# Patient Record
Sex: FEMALE | Race: WHITE | URBAN - NONMETROPOLITAN AREA
[De-identification: names, ages, dates, MRNs, and addresses within clinical notes are randomized per-mention and may not be internally consistent; named-entity substitution may affect disease eponyms.]

---

## 2018-04-25 DIAGNOSIS — M79.671 RIGHT FOOT PAIN: Primary | ICD-10-CM

## 2018-04-25 RX ORDER — HYDROCODONE BITARTRATE AND ACETAMINOPHEN 10; 325 MG/1; MG/1
1 TABLET ORAL EVERY 6 HOURS PRN
Qty: 15 TABLET | Refills: 0 | Status: SHIPPED | OUTPATIENT
Start: 2018-04-25 | End: 2018-05-17

## 2018-04-26 ENCOUNTER — TELEPHONE (OUTPATIENT)
Dept: FAMILY MEDICINE CLINIC | Facility: CLINIC | Age: 33
End: 2018-04-26

## 2018-05-17 ENCOUNTER — PATIENT MESSAGE (OUTPATIENT)
Dept: FAMILY MEDICINE CLINIC | Facility: CLINIC | Age: 33
End: 2018-05-17

## 2018-05-17 NOTE — TELEPHONE ENCOUNTER
From: Mick Starks  To: Jerman Lowe DO  Sent: 5/17/2018 3:02 PM CDT  Subject: Other    So I just finished my pain relievers (hydrocodone). I do ok with managing during the day but I'm still in alot pf pain at the end of the night.  Can you do a refil

## 2018-05-18 RX ORDER — HYDROCODONE BITARTRATE AND ACETAMINOPHEN 10; 325 MG/1; MG/1
1 TABLET ORAL EVERY 6 HOURS PRN
Qty: 15 TABLET | Refills: 0 | Status: SHIPPED | OUTPATIENT
Start: 2018-05-18 | End: 2018-08-01 | Stop reason: ALTCHOICE

## 2018-07-27 RX ORDER — ACYCLOVIR 50 MG/G
OINTMENT TOPICAL
Qty: 15 G | Refills: 0 | Status: SHIPPED | OUTPATIENT
Start: 2018-07-27 | End: 2018-08-01 | Stop reason: ALTCHOICE

## 2018-08-01 ENCOUNTER — TELEPHONE (OUTPATIENT)
Dept: FAMILY MEDICINE CLINIC | Facility: CLINIC | Age: 33
End: 2018-08-01

## 2018-08-01 ENCOUNTER — OFFICE VISIT (OUTPATIENT)
Dept: FAMILY MEDICINE CLINIC | Facility: CLINIC | Age: 33
End: 2018-08-01
Payer: OTHER GOVERNMENT

## 2018-08-01 VITALS
SYSTOLIC BLOOD PRESSURE: 118 MMHG | BODY MASS INDEX: 36.19 KG/M2 | DIASTOLIC BLOOD PRESSURE: 80 MMHG | HEIGHT: 63 IN | WEIGHT: 204.25 LBS | TEMPERATURE: 98 F

## 2018-08-01 DIAGNOSIS — L05.91 INFECTED PILONIDAL CYST: Primary | ICD-10-CM

## 2018-08-01 PROCEDURE — 96372 THER/PROPH/DIAG INJ SC/IM: CPT | Performed by: FAMILY MEDICINE

## 2018-08-01 PROCEDURE — 99214 OFFICE O/P EST MOD 30 MIN: CPT | Performed by: FAMILY MEDICINE

## 2018-08-01 RX ORDER — AMOXICILLIN AND CLAVULANATE POTASSIUM 875; 125 MG/1; MG/1
1 TABLET, FILM COATED ORAL 2 TIMES DAILY
Qty: 20 TABLET | Refills: 0 | Status: SHIPPED | OUTPATIENT
Start: 2018-08-01 | End: 2018-08-11

## 2018-08-01 RX ORDER — CEFTRIAXONE 1 G/1
1000 INJECTION, POWDER, FOR SOLUTION INTRAMUSCULAR; INTRAVENOUS ONCE
Status: COMPLETED | OUTPATIENT
Start: 2018-08-01 | End: 2018-08-01

## 2018-08-01 RX ADMIN — CEFTRIAXONE 1000 MG: 1 INJECTION, POWDER, FOR SOLUTION INTRAMUSCULAR; INTRAVENOUS at 14:30:00

## 2018-08-01 NOTE — PROGRESS NOTES
Mick Starks is a 28year old female. HPI:   Kelly Cerna had a pilonidal cyst resected 5 years ago. Past week has had tenderness and feels scar is splitting open and oozing. No fever. Is worried has recurring cyst. Tender to sit down.      Current Outpatie is asked to return in 2 days.

## 2018-08-01 NOTE — TELEPHONE ENCOUNTER
Problems about a week. Not for certain this was the site of surgery. Red on both sides of \"butt cheeks/crack\". Bleeding, but not excessively from incision site. Uncomfortable. Appointment scheduled.

## 2018-08-01 NOTE — PATIENT INSTRUCTIONS
Pilonidal Cyst, Infected (Antibiotic Treatment)  A pilonidal cyst is a swelling that starts under the skin on the sacrum near the tailbone. It may look like a small dimple. It can fill with skin oils, hair, and dead skin cells.  It may stay small or grow · Take acetaminophen or ibuprofen for pain, unless you were given a different pain medicine to use. Talk with your doctor before using these medicines if you have chronic liver or kidney disease, or have ever had a stomach ulcer or digestive bleeding.  Also

## 2018-08-01 NOTE — TELEPHONE ENCOUNTER
SHE HAD SURGERY QUITE A FEW YEARS AGO AND NOW THAT SITE IS OPENING UP AND IT IS ON HER TAILBONE.  SORE

## 2018-08-02 NOTE — PROGRESS NOTES
Baljinder Tuttle is a 28year old female. HPI:   Rafael Haro is here for follow up on her cellulitis at surgical site ( old) of her pilonidal cyst. Did well with rocephin and is taking augmentin. Doing sitz baths and is feeling better. No fever.     Current Ou

## 2018-08-02 NOTE — PATIENT INSTRUCTIONS
Wound Check, Infection  You have a wound that has become infected. The wound will not heal properly unless the infection is cleared. Infection in a wound may also spread if it is not treated.  In most cases, antibiotic medicines are prescribed to treat a ? Throw out the old dressing. ? Wash your hands again before opening the new, clean dressing. ? Wash your hands again when you are done.   Follow-up care  Follow up with your healthcare provider as advised. If a culture was done, you will be notified if y

## 2018-08-03 ENCOUNTER — OFFICE VISIT (OUTPATIENT)
Dept: FAMILY MEDICINE CLINIC | Facility: CLINIC | Age: 33
End: 2018-08-03
Payer: OTHER GOVERNMENT

## 2018-08-03 VITALS
WEIGHT: 203 LBS | BODY MASS INDEX: 36 KG/M2 | TEMPERATURE: 98 F | OXYGEN SATURATION: 99 % | DIASTOLIC BLOOD PRESSURE: 68 MMHG | HEART RATE: 74 BPM | SYSTOLIC BLOOD PRESSURE: 120 MMHG

## 2018-08-03 DIAGNOSIS — Z09 FOLLOW UP: Primary | ICD-10-CM

## 2018-08-03 DIAGNOSIS — L05.91 INFECTED PILONIDAL CYST: ICD-10-CM

## 2018-08-03 PROCEDURE — 99213 OFFICE O/P EST LOW 20 MIN: CPT | Performed by: FAMILY MEDICINE

## 2018-08-29 ENCOUNTER — PATIENT MESSAGE (OUTPATIENT)
Dept: FAMILY MEDICINE CLINIC | Facility: CLINIC | Age: 33
End: 2018-08-29

## 2018-08-29 ENCOUNTER — TELEPHONE (OUTPATIENT)
Dept: FAMILY MEDICINE CLINIC | Facility: CLINIC | Age: 33
End: 2018-08-29

## 2018-08-29 NOTE — TELEPHONE ENCOUNTER
Pt. Twin Wasserman she has infection under finger nail. She wanted to know if Dr. Krissy Sandra would call in any antibiotics.

## 2018-08-29 NOTE — TELEPHONE ENCOUNTER
From: Rissa Arredondo  To: Veronda Lefort, DO  Sent: 8/29/2018 12:21 PM CDT  Subject: Non-Urgent Medical Question    I have a infection on my index finger under the cuticle. Not sure what I did but it is very painful and started to ooze push today.  There i

## 2018-08-29 NOTE — TELEPHONE ENCOUNTER
Pt states she was doing some gardening the other day. She was wearing gloves. Yesterday she woke up and her L index finger on the outer part was painful. Area became warm and swollen and had a bump on it. She pushed on it this morning and pus came out.  Are

## 2018-08-30 ENCOUNTER — OFFICE VISIT (OUTPATIENT)
Dept: FAMILY MEDICINE CLINIC | Facility: CLINIC | Age: 33
End: 2018-08-30
Payer: OTHER GOVERNMENT

## 2018-08-30 VITALS
DIASTOLIC BLOOD PRESSURE: 78 MMHG | WEIGHT: 199.13 LBS | TEMPERATURE: 97 F | SYSTOLIC BLOOD PRESSURE: 128 MMHG | BODY MASS INDEX: 35 KG/M2

## 2018-08-30 DIAGNOSIS — L03.012 PARONYCHIA OF FINGER, LEFT: Primary | ICD-10-CM

## 2018-08-30 PROCEDURE — 99213 OFFICE O/P EST LOW 20 MIN: CPT | Performed by: INTERNAL MEDICINE

## 2018-08-30 RX ORDER — AMOXICILLIN 500 MG/1
500 CAPSULE ORAL 3 TIMES DAILY
Qty: 21 CAPSULE | Refills: 0 | Status: SHIPPED | OUTPATIENT
Start: 2018-08-30 | End: 2018-09-06

## 2018-08-30 NOTE — PROGRESS NOTES
Alexandra Ellington is a 28year old female. HPI:   Pt has a left index finger with tenderness and expression of pus. She works as a hairdresser.      Current Outpatient Prescriptions:  amoxicillin 500 MG Oral Cap Take 1 capsule (500 mg total) by mouth 3 (thre

## 2018-11-10 ENCOUNTER — TELEPHONE (OUTPATIENT)
Dept: FAMILY MEDICINE CLINIC | Facility: CLINIC | Age: 33
End: 2018-11-10

## 2018-11-10 RX ORDER — ACYCLOVIR 50 MG/G
OINTMENT TOPICAL
Qty: 15 G | Refills: 0 | Status: SHIPPED | OUTPATIENT
Start: 2018-11-10

## 2019-03-07 ENCOUNTER — TELEPHONE (OUTPATIENT)
Dept: FAMILY MEDICINE CLINIC | Facility: CLINIC | Age: 34
End: 2019-03-07

## 2019-03-07 NOTE — TELEPHONE ENCOUNTER
UTI, BLEEDING AND 20 WEEKS PREGNANT. BEEN DRIVING FOR OVER 20 HOURS. IS STILL 2 HOURS AWAY FROM [place] ASKING TO STOP IN TOWN TO  SCRIPT.   OBGYN IS NOT IN OFFICE TODAY   PLEASE CALL ASAP

## 2019-03-07 NOTE — TELEPHONE ENCOUNTER
LM for patient that we are closed, she should go to the nearest ER or UC. Call us with an update tomorrow.  JARVIS Mederos RN

## 2019-08-02 ENCOUNTER — OFFICE VISIT (OUTPATIENT)
Dept: FAMILY MEDICINE CLINIC | Facility: CLINIC | Age: 34
End: 2019-08-02
Payer: OTHER GOVERNMENT

## 2019-08-02 VITALS
HEART RATE: 84 BPM | HEIGHT: 63.75 IN | RESPIRATION RATE: 20 BRPM | DIASTOLIC BLOOD PRESSURE: 80 MMHG | SYSTOLIC BLOOD PRESSURE: 114 MMHG | TEMPERATURE: 98 F | WEIGHT: 229 LBS | BODY MASS INDEX: 39.58 KG/M2

## 2019-08-02 DIAGNOSIS — H61.21 HEARING LOSS OF RIGHT EAR DUE TO CERUMEN IMPACTION: Primary | ICD-10-CM

## 2019-08-02 PROCEDURE — 99213 OFFICE O/P EST LOW 20 MIN: CPT | Performed by: FAMILY MEDICINE

## 2019-08-02 NOTE — PATIENT INSTRUCTIONS
Earwax Removal    The ear canal makes earwax from the canal’s lining. The ears make wax to lubricate and protect the ear canal. The ear canal is the tube that connects the middle ear to the outside of the ear.  The wax protects the ear from bacteria, infe · Don’t use cotton swabs in your ears. Cotton swabs may push wax deeper into the ear canal or damage the eardrum.  Use cotton gauze or a wet washcloth  to gently remove wax on the outside of the ear and around the opening to the ear canal.  · Don't use any © 4837-6526 [address] [facility] [place]. All rights reserved. This information is not intended as a substitute for professional medical care. Always follow your healthcare professional's instructions.

## 2019-08-02 NOTE — PROGRESS NOTES
HPI:   Jorge Marlow is a 27year old female who presents for upper respiratory symptoms for  Plugged ear on right. Sister just diagnosed with sudden hearing loss. Etiology not known. Right ear plugged .  Used candle ear wick with some improvement and now Prescriptions      No prescriptions requested or ordered in this encounter       Imaging & Consults:  None    Cerumen removed with irrigation  Hearing normal after removal.   If hearing changes to return and will get hearing test.      The patient indicate

## 2019-08-15 ENCOUNTER — TELEPHONE (OUTPATIENT)
Dept: FAMILY MEDICINE CLINIC | Facility: CLINIC | Age: 34
End: 2019-08-15

## 2019-08-15 RX ORDER — ONDANSETRON 4 MG/1
4 TABLET, FILM COATED ORAL EVERY 8 HOURS PRN
Qty: 20 TABLET | Refills: 2 | Status: SHIPPED | OUTPATIENT
Start: 2019-08-15

## 2019-08-15 NOTE — TELEPHONE ENCOUNTER
EXCESSIVE DIARRHEA FOR 5 DAYS, CAN SHE BE SEEN TOMORROW @ 4:15 ALONG WITH HER SON WHO ALREADY HAS APPT?  PT ALSO SENT [employer] MESSAGE REAGRDING THIS

## 2019-08-15 NOTE — TELEPHONE ENCOUNTER
The pt states she has had liquid diarrhea for 5 days. She has had nausea and vomiting. Denies fevers.  She is able to get in about 32 oz of fluids a day and is eating small amounts as she is breast feeding her  and will only eat because she knows she

## 2019-08-15 NOTE — TELEPHONE ENCOUNTER
The pt is aware and v/u. She states no one in her house is sick and there is 8 of them. She wants to make sure that the Zofran is ok to take while breast feeding?

## 2019-08-16 DIAGNOSIS — R19.7 DIARRHEA OF PRESUMED INFECTIOUS ORIGIN: Primary | ICD-10-CM

## 2020-04-30 ENCOUNTER — MED REC SCAN ONLY (OUTPATIENT)
Dept: FAMILY MEDICINE CLINIC | Facility: CLINIC | Age: 35
End: 2020-04-30

## 2020-10-21 ENCOUNTER — OFFICE VISIT (OUTPATIENT)
Dept: FAMILY MEDICINE CLINIC | Facility: CLINIC | Age: 35
End: 2020-10-21
Payer: MEDICAID

## 2020-10-21 VITALS
SYSTOLIC BLOOD PRESSURE: 122 MMHG | HEART RATE: 88 BPM | WEIGHT: 214 LBS | DIASTOLIC BLOOD PRESSURE: 78 MMHG | HEIGHT: 63.75 IN | BODY MASS INDEX: 36.99 KG/M2 | RESPIRATION RATE: 18 BRPM | TEMPERATURE: 97 F | OXYGEN SATURATION: 99 %

## 2020-10-21 DIAGNOSIS — D18.00 HEMANGIOMA, MULTIPLE: Primary | ICD-10-CM

## 2020-10-21 DIAGNOSIS — Z23 NEED FOR VACCINATION: ICD-10-CM

## 2020-10-21 PROCEDURE — 90471 IMMUNIZATION ADMIN: CPT | Performed by: FAMILY MEDICINE

## 2020-10-21 PROCEDURE — 3008F BODY MASS INDEX DOCD: CPT | Performed by: FAMILY MEDICINE

## 2020-10-21 PROCEDURE — 90686 IIV4 VACC NO PRSV 0.5 ML IM: CPT | Performed by: FAMILY MEDICINE

## 2020-10-21 PROCEDURE — 99214 OFFICE O/P EST MOD 30 MIN: CPT | Performed by: FAMILY MEDICINE

## 2020-10-21 PROCEDURE — 3074F SYST BP LT 130 MM HG: CPT | Performed by: FAMILY MEDICINE

## 2020-10-21 PROCEDURE — 3078F DIAST BP <80 MM HG: CPT | Performed by: FAMILY MEDICINE

## 2020-10-21 RX ORDER — ACYCLOVIR 50 MG/G
OINTMENT TOPICAL
Qty: 15 G | Refills: 0 | Status: CANCELLED | OUTPATIENT
Start: 2020-10-21

## 2020-10-21 NOTE — PROGRESS NOTES
Mariel Espinosa is a 38year old female. HPI:   Has new skin lesions around her nipples. Right breast with more than left. Noticed many small red dots over her   Multiple pregnancies. No longer nursing.    Current Outpatient Medications   Medication Sig D

## 2021-01-07 ENCOUNTER — HOSPITAL ENCOUNTER (OUTPATIENT)
Age: 36
Discharge: OTHER INSTITUTION | End: 2021-01-07

## 2021-01-07 ENCOUNTER — TELEPHONE (OUTPATIENT)
Dept: FAMILY MEDICINE CLINIC | Facility: CLINIC | Age: 36
End: 2021-01-07

## 2021-01-07 NOTE — TELEPHONE ENCOUNTER
Virtual/Telephone Check-In    Maggie Velasquez verbally {consents to a Virtual/Telephone Check-In service on 01/07/21. Patient has been referred to the [facility] website at [email or web address] to review the yearly Consent to Treat document.   Patient unders

## 2021-01-22 ENCOUNTER — TELEPHONE (OUTPATIENT)
Dept: FAMILY MEDICINE CLINIC | Facility: CLINIC | Age: 36
End: 2021-01-22

## 2021-01-22 NOTE — TELEPHONE ENCOUNTER
Spoke with patient who states she tested positive for covid on 1/7/21 she has completed quarantine and has no symptoms, she is wondering if it is ok for her to go out.  Advised she has completed quarantine and is ok to go out; just limit contact with lizeth

## 2021-03-26 NOTE — TELEPHONE ENCOUNTER
Beni Terrell is calling she went to use her nebulizer and it's broken and it's not blowing out any air. Can we order a new one to [facility] in [facility], [facility]..

## 2021-03-26 NOTE — TELEPHONE ENCOUNTER
Talked with Luigi Hardwick and she states she would like a Rx sent to [place] for a new nebulizer. Her's is not working and sounds like something is broken. She has been having some congestion and mild shortness of breath; thinks is allergy related.    I don't

## 2021-07-17 ENCOUNTER — PATIENT MESSAGE (OUTPATIENT)
Dept: FAMILY MEDICINE CLINIC | Facility: CLINIC | Age: 36
End: 2021-07-17

## 2021-07-19 NOTE — TELEPHONE ENCOUNTER
From: Emilie Pink  To: Elizabeth Chowdhury DO  Sent: 7/17/2021 3:30 PM CDT  Subject: Non-Urgent Medical Question    So my BRO just recently got diagnosed with stage four prostrate cancer. He has developed a rash in his groin area.  He goes to [employer]

## 2021-09-29 ENCOUNTER — TELEPHONE (OUTPATIENT)
Dept: FAMILY MEDICINE CLINIC | Facility: CLINIC | Age: 36
End: 2021-09-29

## 2021-09-29 NOTE — TELEPHONE ENCOUNTER
SHE IS ASKING FOR YOU TO HAVE DR PALMA LOOK AT THE MY CHART MESSAGE SHE SENT TO HER BECAUSE HER HEART RATE IS LOWER THAN USUAL AND SHE NEEDS TO KNOW IF SHE NEEDS TOO BE SEEN

## 2021-09-29 NOTE — TELEPHONE ENCOUNTER
Advised patient that Dr. Jean Monahan would like her to make an appt to be evaluated. Appt scheduled. Patient denies any chest pain, shortness of breath.  Patient states she has had low heart rate in the past.   Future Appointments   Date Time Provider Department

## 2021-09-30 ENCOUNTER — PATIENT MESSAGE (OUTPATIENT)
Dept: FAMILY MEDICINE CLINIC | Facility: CLINIC | Age: 36
End: 2021-09-30

## 2021-09-30 NOTE — TELEPHONE ENCOUNTER
From: Dain Barry  To: Nacho Dai DO  Sent: 9/30/2021 1:24 PM CDT  Subject: Rash on thigh    I have an appointment tomorrow but Brandon Buenrostro noticed this rash on my right thigh and thought possible shingles?  Could that be the cause for the headache and l

## 2021-10-01 ENCOUNTER — HOSPITAL ENCOUNTER (OUTPATIENT)
Dept: GENERAL RADIOLOGY | Age: 36
Discharge: HOME | End: 2021-10-01
Attending: FAMILY MEDICINE
Payer: MEDICAID

## 2021-10-01 ENCOUNTER — OFFICE VISIT (OUTPATIENT)
Dept: FAMILY MEDICINE CLINIC | Facility: CLINIC | Age: 36
End: 2021-10-01
Payer: MEDICAID

## 2021-10-01 VITALS
RESPIRATION RATE: 12 BRPM | HEART RATE: 65 BPM | DIASTOLIC BLOOD PRESSURE: 74 MMHG | BODY MASS INDEX: 39 KG/M2 | TEMPERATURE: 97 F | SYSTOLIC BLOOD PRESSURE: 120 MMHG | WEIGHT: 226 LBS

## 2021-10-01 DIAGNOSIS — U09.9 COVID-19 LONG HAULER MANIFESTING CHRONIC PALPITATIONS: ICD-10-CM

## 2021-10-01 DIAGNOSIS — L73.9 FOLLICULITIS: ICD-10-CM

## 2021-10-01 DIAGNOSIS — R00.2 COVID-19 LONG HAULER MANIFESTING CHRONIC PALPITATIONS: ICD-10-CM

## 2021-10-01 DIAGNOSIS — R42 DIZZINESS OF UNKNOWN CAUSE: ICD-10-CM

## 2021-10-01 DIAGNOSIS — R00.1 BRADYCARDIA: Primary | ICD-10-CM

## 2021-10-01 DIAGNOSIS — R00.1 BRADYCARDIA: ICD-10-CM

## 2021-10-01 PROCEDURE — 3078F DIAST BP <80 MM HG: CPT | Performed by: FAMILY MEDICINE

## 2021-10-01 PROCEDURE — 3074F SYST BP LT 130 MM HG: CPT | Performed by: FAMILY MEDICINE

## 2021-10-01 PROCEDURE — 93227 XTRNL ECG REC<48 HR R&I: CPT | Performed by: FAMILY MEDICINE

## 2021-10-01 PROCEDURE — 93225 XTRNL ECG REC<48 HRS REC: CPT | Performed by: FAMILY MEDICINE

## 2021-10-01 PROCEDURE — 93000 ELECTROCARDIOGRAM COMPLETE: CPT | Performed by: FAMILY MEDICINE

## 2021-10-01 PROCEDURE — 99214 OFFICE O/P EST MOD 30 MIN: CPT | Performed by: FAMILY MEDICINE

## 2021-10-01 NOTE — PROGRESS NOTES
Jeremy Carver is a 39year old female. HPI:   Loan Hough is here for evalutaion of low heart rate. Has had 6 pregnancies. Most recently 2 months ago with frateranl twins. . Has been nusring them. Energy is fair.  Had covid in January while pregnant with he pregnancies ( cardiomyopathy and covid as late consequence)  - ELECTROCARDIOGRAM, COMPLETE  - TSH+FREE T4; Future  - CBC WITH DIFFERENTIAL WITH PLATELET; Future  - CARD ECHO 2D W/O DOPPLER (CPT=92962); Future  - CARD MONITOR HOLTER 24 HOUR (CPT=93950);  Fut

## 2021-10-01 NOTE — PATIENT INSTRUCTIONS
Understanding Bradycardia    Your heart has an electrical system that sends signals to control the heartbeat. Any abnormal change in the speed or pattern of the heartbeat is called an arrhythmia.  An arrhythmia that causes the heart to beat slower than no stopping the medicine, under your doctor’s guidance, may correct the problem. Or if a condition such as an underactive thyroid is the cause, treating the thyroid may keep bradycardia from coming back. · Medicines.  Medicines may be used to treat conditions

## 2021-12-22 ENCOUNTER — NURSE ONLY (OUTPATIENT)
Dept: FAMILY MEDICINE CLINIC | Facility: CLINIC | Age: 36
End: 2021-12-22
Payer: MEDICAID

## 2021-12-22 ENCOUNTER — TELEPHONE (OUTPATIENT)
Dept: FAMILY MEDICINE CLINIC | Facility: CLINIC | Age: 36
End: 2021-12-22

## 2021-12-22 DIAGNOSIS — R30.0 DYSURIA: Primary | ICD-10-CM

## 2021-12-22 PROCEDURE — 87086 URINE CULTURE/COLONY COUNT: CPT | Performed by: FAMILY MEDICINE

## 2021-12-22 RX ORDER — PHENAZOPYRIDINE HYDROCHLORIDE 200 MG/1
200 TABLET, FILM COATED ORAL 3 TIMES DAILY PRN
Qty: 9 TABLET | Refills: 0 | Status: SHIPPED | OUTPATIENT
Start: 2021-12-22

## 2021-12-22 RX ORDER — CEPHALEXIN 500 MG/1
500 CAPSULE ORAL 3 TIMES DAILY
Qty: 21 CAPSULE | Refills: 0 | Status: SHIPPED | OUTPATIENT
Start: 2021-12-22

## 2021-12-22 NOTE — TELEPHONE ENCOUNTER
[facility] states she developed dysuria last night along with frequency. No fever, no nausea or vomiting. Took Azo this am. Advised per Dr. Jerald Mcgovern to come in to obtain urine specimen for culture this am. Will send for culture.  Sent Rx for Keflex and pyridium

## 2022-02-09 ENCOUNTER — TELEPHONE (OUTPATIENT)
Dept: FAMILY MEDICINE CLINIC | Facility: CLINIC | Age: 37
End: 2022-02-09

## 2022-02-09 RX ORDER — TRAMADOL HYDROCHLORIDE 50 MG/1
50 TABLET ORAL EVERY 6 HOURS PRN
Qty: 30 TABLET | Refills: 1 | Status: SHIPPED | OUTPATIENT
Start: 2022-02-09

## 2022-02-09 RX ORDER — CYCLOBENZAPRINE HCL 10 MG
10 TABLET ORAL 3 TIMES DAILY
Qty: 30 TABLET | Refills: 1 | Status: SHIPPED | OUTPATIENT
Start: 2022-02-09 | End: 2022-03-01

## 2022-02-09 NOTE — TELEPHONE ENCOUNTER
I sent over tramadol 50 mg up to 3 times a day for pain    Flexeril 10 mg at night. Pump and dump breastmilk while on this.

## 2022-02-09 NOTE — TELEPHONE ENCOUNTER
Informed patient that Dr. Iggy Welch has sent prescriptions to pharmacy and she needs to pump and dump breastmilk while on medication. Advised to follow up if no improvement or if worsening.

## 2022-02-09 NOTE — TELEPHONE ENCOUNTER
Patient states had episode of vomiting on Sunday night. Next am developed left upper back pain. Saw chiropractor and he thinks pain may due to that, had manipulation and xrays today. Has been using motrin, tylenol, ice. Has noted a tingling sensation to left upper arm. Pain worse with movement.

## 2022-02-09 NOTE — TELEPHONE ENCOUNTER
SHE HAS A SPRAINED DISC IN HER BACK FROM VOMITING. SHE IS TAKING TYLENOL AND IBUPROFEN AND WANTS TO KNOW IF THERE IS ANYTHING ELSE SHE CAN TAKE.   [address] [facility] HER CHIROPRACTOR

## 2022-03-26 RX ORDER — ACYCLOVIR 50 MG/G
OINTMENT TOPICAL
Qty: 15 G | Refills: 0 | Status: CANCELLED | OUTPATIENT
Start: 2022-03-26

## 2022-03-28 RX ORDER — ACYCLOVIR 50 MG/G
OINTMENT TOPICAL
Qty: 15 G | Refills: 0 | Status: SHIPPED | OUTPATIENT
Start: 2022-03-28

## 2022-03-28 NOTE — TELEPHONE ENCOUNTER
Last refill - 11/10/18  Last office visit - 10/1/21  Contacted patient. Has a fever blister on her upper lip for the last 3 days.

## 2022-03-28 NOTE — TELEPHONE ENCOUNTER
Patient advised. Appointment scheduled.    Future Appointments   Date Time Provider Demetrius Granados   5/17/2022 10:00 AM Bert Guardado, DO [facility] [place]

## 2022-05-02 ENCOUNTER — TELEPHONE (OUTPATIENT)
Dept: FAMILY MEDICINE CLINIC | Facility: CLINIC | Age: 37
End: 2022-05-02

## 2022-05-02 NOTE — TELEPHONE ENCOUNTER
Mother calls asking if she needs a referral for the US of her back that Dr. Mirian Christine recommended? Also, where can she go for this? She is asking for a location that is close to home.

## 2022-05-10 ENCOUNTER — OFFICE VISIT (OUTPATIENT)
Dept: FAMILY MEDICINE CLINIC | Facility: CLINIC | Age: 37
End: 2022-05-10
Payer: MEDICAID

## 2022-05-10 VITALS
SYSTOLIC BLOOD PRESSURE: 120 MMHG | DIASTOLIC BLOOD PRESSURE: 76 MMHG | RESPIRATION RATE: 18 BRPM | WEIGHT: 212 LBS | TEMPERATURE: 97 F | HEIGHT: 63.75 IN | OXYGEN SATURATION: 99 % | HEART RATE: 79 BPM | BODY MASS INDEX: 36.64 KG/M2

## 2022-05-10 DIAGNOSIS — L71.9 ROSACEA, ACNE: ICD-10-CM

## 2022-05-10 DIAGNOSIS — R22.2 PALPABLE MASS OF LOWER BACK: ICD-10-CM

## 2022-05-10 DIAGNOSIS — Z00.00 ROUTINE HEALTH MAINTENANCE: Primary | ICD-10-CM

## 2022-05-10 DIAGNOSIS — Z01.419 ENCOUNTER FOR CERVICAL PAP SMEAR WITH PELVIC EXAM: ICD-10-CM

## 2022-05-10 PROBLEM — O30.049 DICHORIONIC DIAMNIOTIC TWIN PREGNANCY, ANTEPARTUM: Status: ACTIVE | Noted: 2021-04-12

## 2022-05-10 PROBLEM — O30.049 DICHORIONIC DIAMNIOTIC TWIN PREGNANCY, ANTEPARTUM (HCC): Status: ACTIVE | Noted: 2021-04-12

## 2022-05-10 LAB
ALBUMIN SERPL-MCNC: 4.1 G/DL (ref 3.4–5)
ALBUMIN/GLOB SERPL: 1.1 {RATIO} (ref 1–2)
ALP LIVER SERPL-CCNC: 82 U/L
ALT SERPL-CCNC: 25 U/L
ANION GAP SERPL CALC-SCNC: 3 MMOL/L (ref 0–18)
AST SERPL-CCNC: 13 U/L (ref 15–37)
BASOPHILS # BLD AUTO: 0.03 X10(3) UL (ref 0–0.2)
BASOPHILS NFR BLD AUTO: 0.4 %
BILIRUB SERPL-MCNC: 0.4 MG/DL (ref 0.1–2)
BUN BLD-MCNC: 23 MG/DL (ref 7–18)
CALCIUM BLD-MCNC: 9.2 MG/DL (ref 8.5–10.1)
CHLORIDE SERPL-SCNC: 106 MMOL/L (ref 98–112)
CHOLEST SERPL-MCNC: 206 MG/DL (ref ?–200)
CO2 SERPL-SCNC: 30 MMOL/L (ref 21–32)
CREAT BLD-MCNC: 0.82 MG/DL
EOSINOPHIL # BLD AUTO: 0.08 X10(3) UL (ref 0–0.7)
EOSINOPHIL NFR BLD AUTO: 1.2 %
ERYTHROCYTE [DISTWIDTH] IN BLOOD BY AUTOMATED COUNT: 13.2 %
GLOBULIN PLAS-MCNC: 3.6 G/DL (ref 2.8–4.4)
GLUCOSE BLD-MCNC: 85 MG/DL (ref 70–99)
HCT VFR BLD AUTO: 43.3 %
HDLC SERPL-MCNC: 61 MG/DL (ref 40–59)
HGB BLD-MCNC: 13.9 G/DL
IMM GRANULOCYTES # BLD AUTO: 0.02 X10(3) UL (ref 0–1)
IMM GRANULOCYTES NFR BLD: 0.3 %
LDLC SERPL CALC-MCNC: 113 MG/DL (ref ?–100)
LYMPHOCYTES # BLD AUTO: 2.4 X10(3) UL (ref 1–4)
LYMPHOCYTES NFR BLD AUTO: 34.8 %
MCH RBC QN AUTO: 30.2 PG (ref 26–34)
MCHC RBC AUTO-ENTMCNC: 32.1 G/DL (ref 31–37)
MCV RBC AUTO: 94.1 FL
MONOCYTES # BLD AUTO: 0.55 X10(3) UL (ref 0.1–1)
MONOCYTES NFR BLD AUTO: 8 %
NEUTROPHILS # BLD AUTO: 3.82 X10 (3) UL (ref 1.5–7.7)
NEUTROPHILS # BLD AUTO: 3.82 X10(3) UL (ref 1.5–7.7)
NEUTROPHILS NFR BLD AUTO: 55.3 %
NONHDLC SERPL-MCNC: 145 MG/DL (ref ?–130)
OSMOLALITY SERPL CALC.SUM OF ELEC: 291 MOSM/KG (ref 275–295)
PLATELET # BLD AUTO: 361 10(3)UL (ref 150–450)
POTASSIUM SERPL-SCNC: 4.4 MMOL/L (ref 3.5–5.1)
PROT SERPL-MCNC: 7.7 G/DL (ref 6.4–8.2)
RBC # BLD AUTO: 4.6 X10(6)UL
SODIUM SERPL-SCNC: 139 MMOL/L (ref 136–145)
TRIGL SERPL-MCNC: 185 MG/DL (ref 30–149)
TSI SER-ACNC: 1.39 MIU/ML (ref 0.36–3.74)
VLDLC SERPL CALC-MCNC: 32 MG/DL (ref 0–30)
WBC # BLD AUTO: 6.9 X10(3) UL (ref 4–11)

## 2022-05-10 PROCEDURE — 3074F SYST BP LT 130 MM HG: CPT | Performed by: FAMILY MEDICINE

## 2022-05-10 PROCEDURE — 3078F DIAST BP <80 MM HG: CPT | Performed by: FAMILY MEDICINE

## 2022-05-10 PROCEDURE — 99395 PREV VISIT EST AGE 18-39: CPT | Performed by: FAMILY MEDICINE

## 2022-05-10 PROCEDURE — 80053 COMPREHEN METABOLIC PANEL: CPT | Performed by: FAMILY MEDICINE

## 2022-05-10 PROCEDURE — 3008F BODY MASS INDEX DOCD: CPT | Performed by: FAMILY MEDICINE

## 2022-05-10 PROCEDURE — 85025 COMPLETE CBC W/AUTO DIFF WBC: CPT | Performed by: FAMILY MEDICINE

## 2022-05-10 PROCEDURE — 88175 CYTOPATH C/V AUTO FLUID REDO: CPT | Performed by: FAMILY MEDICINE

## 2022-05-10 PROCEDURE — 80061 LIPID PANEL: CPT | Performed by: FAMILY MEDICINE

## 2022-05-10 PROCEDURE — 84443 ASSAY THYROID STIM HORMONE: CPT | Performed by: FAMILY MEDICINE

## 2022-05-10 RX ORDER — ACYCLOVIR 50 MG/G
OINTMENT TOPICAL
Qty: 15 G | Refills: 0 | Status: SHIPPED | OUTPATIENT
Start: 2022-05-10 | End: 2022-05-13

## 2022-05-13 RX ORDER — ACYCLOVIR 50 MG/G
OINTMENT TOPICAL
Qty: 15 G | Refills: 0 | Status: SHIPPED | OUTPATIENT
Start: 2022-05-13

## 2022-05-20 ENCOUNTER — PATIENT MESSAGE (OUTPATIENT)
Dept: FAMILY MEDICINE CLINIC | Facility: CLINIC | Age: 37
End: 2022-05-20

## 2022-05-21 NOTE — TELEPHONE ENCOUNTER
From: Serafin Barnett  To: Racquel Pritchard DO  Sent: 5/20/2022 9:19 PM CDT  Subject: Stye in my right eye. So I currently have a stye inside my right eyelid. I'm doing hot compresses and eye drops. What else can I do to heal this? Thank you.

## 2022-05-23 ENCOUNTER — OFFICE VISIT (OUTPATIENT)
Dept: FAMILY MEDICINE CLINIC | Facility: CLINIC | Age: 37
End: 2022-05-23
Payer: MEDICAID

## 2022-05-23 ENCOUNTER — TELEPHONE (OUTPATIENT)
Dept: FAMILY MEDICINE CLINIC | Facility: CLINIC | Age: 37
End: 2022-05-23

## 2022-05-23 VITALS
RESPIRATION RATE: 16 BRPM | HEIGHT: 63.75 IN | DIASTOLIC BLOOD PRESSURE: 80 MMHG | BODY MASS INDEX: 36.64 KG/M2 | HEART RATE: 78 BPM | OXYGEN SATURATION: 98 % | SYSTOLIC BLOOD PRESSURE: 122 MMHG | TEMPERATURE: 98 F | WEIGHT: 212 LBS

## 2022-05-23 DIAGNOSIS — H00.12 CHALAZION OF RIGHT LOWER EYELID: Primary | ICD-10-CM

## 2022-05-23 PROCEDURE — 99213 OFFICE O/P EST LOW 20 MIN: CPT | Performed by: NURSE PRACTITIONER

## 2022-05-23 PROCEDURE — 3008F BODY MASS INDEX DOCD: CPT | Performed by: NURSE PRACTITIONER

## 2022-05-23 PROCEDURE — 3074F SYST BP LT 130 MM HG: CPT | Performed by: NURSE PRACTITIONER

## 2022-05-23 PROCEDURE — 3079F DIAST BP 80-89 MM HG: CPT | Performed by: NURSE PRACTITIONER

## 2022-05-23 RX ORDER — ERYTHROMYCIN 5 MG/G
1 OINTMENT OPHTHALMIC EVERY 6 HOURS
Qty: 3.5 G | Refills: 0 | Status: SHIPPED | OUTPATIENT
Start: 2022-05-23 | End: 2022-05-28

## 2022-05-23 RX ORDER — SWAB
1 SWAB, NON-MEDICATED MISCELLANEOUS DAILY
COMMUNITY
Start: 2021-01-20

## 2022-05-23 NOTE — TELEPHONE ENCOUNTER
Patient notified.      Patient scheduled an appointment    Future Appointments   Date Time Provider Demetrius Granados   5/23/2022 11:00 AM DOC [facility]

## 2022-05-23 NOTE — TELEPHONE ENCOUNTER
[id number] - [place], [address] AT [address] [facility] [address] [phone], [phone]      PT HAS A STY IN HER EYE, HAS BEEN APPLYING WARM COMPRESSES BUT STILL THERE AND GETTING WORSE- COULD SOMETHING BE CALLED IN FOR HER?    PLEASE ADVISE-THANK YOU

## 2022-05-23 NOTE — TELEPHONE ENCOUNTER
Patient called on 5/23/2022 asking for a prescription to be called it. Message was routed to [facility]. She recommended an office visit for evaluation. Patient notified but denied stating that she has 8 children and would be difficult for her to come in. She is requesting a script from Dr. Lizette Rand when she returns to office.

## 2022-06-06 ENCOUNTER — OFFICE VISIT (OUTPATIENT)
Dept: FAMILY MEDICINE CLINIC | Facility: CLINIC | Age: 37
End: 2022-06-06
Payer: MEDICAID

## 2022-06-06 ENCOUNTER — HOSPITAL ENCOUNTER (OUTPATIENT)
Dept: GENERAL RADIOLOGY | Age: 37
Discharge: HOME | End: 2022-06-06
Attending: FAMILY MEDICINE
Payer: MEDICAID

## 2022-06-06 VITALS
BODY MASS INDEX: 36.38 KG/M2 | HEIGHT: 63.75 IN | SYSTOLIC BLOOD PRESSURE: 118 MMHG | RESPIRATION RATE: 12 BRPM | WEIGHT: 210.5 LBS | OXYGEN SATURATION: 95 % | TEMPERATURE: 97 F | HEART RATE: 69 BPM | DIASTOLIC BLOOD PRESSURE: 80 MMHG

## 2022-06-06 DIAGNOSIS — M25.572 ACUTE LEFT ANKLE PAIN: ICD-10-CM

## 2022-06-06 DIAGNOSIS — M53.3 COCCYX PAIN: Primary | ICD-10-CM

## 2022-06-06 DIAGNOSIS — M53.3 COCCYX PAIN: ICD-10-CM

## 2022-06-06 PROCEDURE — 3074F SYST BP LT 130 MM HG: CPT | Performed by: FAMILY MEDICINE

## 2022-06-06 PROCEDURE — 72220 X-RAY EXAM SACRUM TAILBONE: CPT | Performed by: FAMILY MEDICINE

## 2022-06-06 PROCEDURE — 3079F DIAST BP 80-89 MM HG: CPT | Performed by: FAMILY MEDICINE

## 2022-06-06 PROCEDURE — 3008F BODY MASS INDEX DOCD: CPT | Performed by: FAMILY MEDICINE

## 2022-06-06 PROCEDURE — 73610 X-RAY EXAM OF ANKLE: CPT | Performed by: FAMILY MEDICINE

## 2022-06-06 PROCEDURE — 99214 OFFICE O/P EST MOD 30 MIN: CPT | Performed by: FAMILY MEDICINE

## 2022-06-08 ENCOUNTER — HOSPITAL ENCOUNTER (OUTPATIENT)
Dept: ULTRASOUND IMAGING | Age: 37
Discharge: HOME | End: 2022-06-08
Attending: FAMILY MEDICINE
Payer: MEDICAID

## 2022-06-08 DIAGNOSIS — R22.2 PALPABLE MASS OF LOWER BACK: ICD-10-CM

## 2022-06-08 PROCEDURE — 76705 ECHO EXAM OF ABDOMEN: CPT | Performed by: FAMILY MEDICINE

## 2022-11-01 DIAGNOSIS — N30.00 ACUTE CYSTITIS WITHOUT HEMATURIA: Primary | ICD-10-CM

## 2022-11-01 RX ORDER — AMOXICILLIN 500 MG/1
500 CAPSULE ORAL 3 TIMES DAILY
Qty: 30 CAPSULE | Refills: 0 | Status: SHIPPED | OUTPATIENT
Start: 2022-11-01 | End: 2022-11-11

## 2022-11-01 RX ORDER — PHENAZOPYRIDINE HYDROCHLORIDE 200 MG/1
200 TABLET, FILM COATED ORAL 3 TIMES DAILY PRN
Qty: 12 TABLET | Refills: 0 | Status: SHIPPED | OUTPATIENT
Start: 2022-11-01

## 2022-11-22 ENCOUNTER — TELEPHONE (OUTPATIENT)
Dept: FAMILY MEDICINE CLINIC | Facility: CLINIC | Age: 37
End: 2022-11-22

## 2022-11-22 NOTE — TELEPHONE ENCOUNTER
PC to pt. Had labs drawn by Dr. Sinclair Erps at [place]. Told to call Dr. Sinclair Erps office for results since he is ordering MD. Pt v/u.

## 2023-02-04 ENCOUNTER — TELEPHONE (OUTPATIENT)
Dept: FAMILY MEDICINE CLINIC | Facility: CLINIC | Age: 38
End: 2023-02-04

## 2023-02-04 NOTE — TELEPHONE ENCOUNTER
PC to pt. For 2 days now pt has wet cough, cough almost provokes vomiting, nasal drainage. Checking to see what she can take while being pregnant. DW VINCENZO, Recommended pt to take benadryl and sudafed for symptoms, can also do honey and lemon to help with cough. Pt v/u.

## 2023-03-17 ENCOUNTER — OFFICE VISIT (OUTPATIENT)
Dept: FAMILY MEDICINE CLINIC | Facility: CLINIC | Age: 38
End: 2023-03-17
Payer: MEDICAID

## 2023-03-17 VITALS
HEART RATE: 96 BPM | OXYGEN SATURATION: 99 % | BODY MASS INDEX: 45 KG/M2 | TEMPERATURE: 99 F | DIASTOLIC BLOOD PRESSURE: 60 MMHG | WEIGHT: 259 LBS | SYSTOLIC BLOOD PRESSURE: 100 MMHG

## 2023-03-17 DIAGNOSIS — B07.0 PLANTAR WART, LEFT FOOT: Primary | ICD-10-CM

## 2023-03-17 PROCEDURE — 99212 OFFICE O/P EST SF 10 MIN: CPT | Performed by: FAMILY MEDICINE

## 2023-03-17 PROCEDURE — 3074F SYST BP LT 130 MM HG: CPT | Performed by: FAMILY MEDICINE

## 2023-03-17 PROCEDURE — 3078F DIAST BP <80 MM HG: CPT | Performed by: FAMILY MEDICINE

## 2023-03-17 PROCEDURE — 17110 DESTRUCTION B9 LES UP TO 14: CPT | Performed by: FAMILY MEDICINE

## 2023-03-17 RX ORDER — LANSOPRAZOLE 30 MG/1
30 CAPSULE, DELAYED RELEASE ORAL DAILY
COMMUNITY
Start: 2023-03-08

## 2023-05-28 ENCOUNTER — EXTERNAL LAB (OUTPATIENT)
Dept: OBGYN | Age: 38
End: 2023-05-28

## 2023-05-28 LAB — LAB RESULT: NORMAL

## 2023-06-29 ENCOUNTER — TELEPHONE (OUTPATIENT)
Dept: FAMILY MEDICINE CLINIC | Facility: CLINIC | Age: 38
End: 2023-06-29

## 2024-03-08 ENCOUNTER — OFFICE VISIT (OUTPATIENT)
Dept: FAMILY MEDICINE CLINIC | Facility: CLINIC | Age: 39
End: 2024-03-08
Payer: MEDICAID

## 2024-03-08 VITALS
BODY MASS INDEX: 38 KG/M2 | HEART RATE: 76 BPM | DIASTOLIC BLOOD PRESSURE: 78 MMHG | OXYGEN SATURATION: 98 % | TEMPERATURE: 98 F | WEIGHT: 220 LBS | SYSTOLIC BLOOD PRESSURE: 120 MMHG

## 2024-03-08 DIAGNOSIS — S83.8X9A ACUTE MEDIAL MENISCAL INJURY OF KNEE, INITIAL ENCOUNTER: ICD-10-CM

## 2024-03-08 DIAGNOSIS — M23.91 INTERNAL DERANGEMENT OF RIGHT KNEE: Primary | ICD-10-CM

## 2024-03-08 PROCEDURE — 99214 OFFICE O/P EST MOD 30 MIN: CPT | Performed by: FAMILY MEDICINE

## 2024-03-08 PROCEDURE — L1830 KO IMMOB CANVAS LONG PRE OTS: HCPCS | Performed by: FAMILY MEDICINE

## 2024-03-08 NOTE — PROGRESS NOTES
Joseph French is a 38 year old female.  HPI:   Joseph is here to have knee checked up. Suprapatellar pain. Can net flex her knee. Severe pain with flexion. She has to keep it locked straight.  No current outpatient medications on file.      Past Medical History:   Diagnosis Date    Allergic rhinitis     Female hirsutism       Social History:  Social History     Socioeconomic History    Marital status:     Number of children: 1   Occupational History    Occupation: STYLIST   Tobacco Use    Smoking status: Never    Smokeless tobacco: Never   Vaping Use    Vaping Use: Never used   Substance and Sexual Activity    Alcohol use: No    Drug use: No   Other Topics Concern    Caffeine Concern Yes     Comment: 1  drinks per day    Exercise Yes     Comment: 4  times/week    Seat Belt Yes     Comment: 100%        REVIEW OF SYSTEMS:   GENERAL HEALTH: feels well otherwise  SKIN: denies any unusual skin lesions or rashes  RESPIRATORY: denies shortness of breath with exertion  CARDIOVASCULAR: denies chest pain on exertion  GI: denies abdominal pain and denies heartburn  NEURO: denies headaches    EXAM:   /78   Pulse 76   Temp 97.9 °F (36.6 °C) (Tympanic)   Wt 220 lb (99.8 kg)   LMP  (LMP Unknown)   SpO2 98%   Breastfeeding Yes   BMI 38.06 kg/m²   GENERAL: well developed, well nourished,in no apparent distress  SKIN: no rashes,no suspicious lesions  NECK: supple,no adenopath  LUNGS: clear to auscultation  CARDIO: RRR without murmur  EXTREMITIES: small suprapatellar effusion. Significant tenrderness along medial joint line.     ASSESSMENT AND PLAN:   1. Internal derangement of right knee  - immobilzer  - XR KNEE (3 VIEWS), RIGHT (CPT=73562); Future  -  Addictive MRI KNEE, RIGHT (TZY=29340); Future  - z Insight MRI KNEE, RIGHT (WLI=81127)  - motrin alt tylenol  - if MRI + will refer to ortho    2. Acute medial m  - eniscal injury of knee, initial encounter  - MRI  - immobilizer   - motrin - weill      The  patient indicates understanding of these issues and agrees to the plan.  The patient will be referred to PT or Ortho based on MRI results..

## 2024-03-13 ENCOUNTER — HOSPITAL ENCOUNTER (OUTPATIENT)
Dept: GENERAL RADIOLOGY | Age: 39
Discharge: HOME | End: 2024-03-13
Attending: FAMILY MEDICINE
Payer: MEDICAID

## 2024-03-13 ENCOUNTER — TELEPHONE (OUTPATIENT)
Dept: FAMILY MEDICINE CLINIC | Facility: CLINIC | Age: 39
End: 2024-03-13

## 2024-03-13 DIAGNOSIS — M23.91 INTERNAL DERANGEMENT OF RIGHT KNEE: Primary | ICD-10-CM

## 2024-03-13 DIAGNOSIS — M23.91 INTERNAL DERANGEMENT OF RIGHT KNEE: ICD-10-CM

## 2024-03-13 PROCEDURE — 73562 X-RAY EXAM OF KNEE 3: CPT | Performed by: FAMILY MEDICINE

## 2024-03-13 NOTE — TELEPHONE ENCOUNTER
Called [facility] and verified they accept [facility] Medicaid replacement insurance, will need 7 days to get authorized. Message sent to yme team to initiate auth, order faxed to [facility]. Pt informed to verify auth before test done.

## 2024-03-19 NOTE — TELEPHONE ENCOUNTER
[facility]  Referral updated. [facility] fax [phone].     Patient notified via phone. Patient stated verbal appreciation and understanding.

## 2024-03-27 ENCOUNTER — MED REC SCAN ONLY (OUTPATIENT)
Dept: FAMILY MEDICINE CLINIC | Facility: CLINIC | Age: 39
End: 2024-03-27

## 2024-03-27 ENCOUNTER — TELEPHONE (OUTPATIENT)
Dept: FAMILY MEDICINE CLINIC | Facility: CLINIC | Age: 39
End: 2024-03-27

## 2024-03-27 DIAGNOSIS — S83.241A ACUTE MEDIAL MENISCUS TEAR OF RIGHT KNEE, INITIAL ENCOUNTER: Primary | ICD-10-CM

## 2024-03-27 NOTE — TELEPHONE ENCOUNTER
Tricia your MRI shows your medial meniscus has a tear in the post aspect. I leigha refer you to DR. Rebeca ann for further treatment.

## 2024-03-27 NOTE — TELEPHONE ENCOUNTER
Spoke with patient and she verbalized understanding. Referral faxed to Dr. Behl's office. Patient already has contact information for Dr. Behl.

## 2024-04-12 ENCOUNTER — TELEPHONE (OUTPATIENT)
Dept: FAMILY MEDICINE CLINIC | Facility: CLINIC | Age: 39
End: 2024-04-12

## 2024-04-12 DIAGNOSIS — M54.50 CHRONIC MIDLINE LOW BACK PAIN, UNSPECIFIED WHETHER SCIATICA PRESENT: ICD-10-CM

## 2024-04-12 DIAGNOSIS — M25.552 CHRONIC LEFT HIP PAIN: Primary | ICD-10-CM

## 2024-04-12 DIAGNOSIS — G89.29 CHRONIC MIDLINE LOW BACK PAIN, UNSPECIFIED WHETHER SCIATICA PRESENT: ICD-10-CM

## 2024-04-12 DIAGNOSIS — G89.29 CHRONIC LEFT HIP PAIN: Primary | ICD-10-CM

## 2024-04-12 NOTE — TELEPHONE ENCOUNTER
Pt has an appointment [facility] and because of her insurance she needs a referral and any clinical notes in regards to back and hip pain from  appt is 4/16/24. FAX: [phone]   Pt wife states pt had a rough night with the pain from the stent but other than that he is doing ok. Wife is not sure  when nephrology appt is scheduled. States they are going on a danielito on may 15 appt needs to be canceled. She would like for pt to come in before then to make sure Ping thinks it is ok for them to go on vacation. No available appt within 14 days. Ok for 20 min appt on 5/11/23.

## 2024-04-12 NOTE — TELEPHONE ENCOUNTER
Pt stated that she is having chronic pain on lower left side/hip she said because it is chronic she thinks she's now needing a referral for an MRI. Pt said she takes tylenol, ices it, sees chiropractic and nothing has helped.

## 2024-04-12 NOTE — TELEPHONE ENCOUNTER
PC to pt, Pt had original injury/fall in 6/6/22, has seen [facility] in the past, but wanting to change to [facility] in [place]. They are requesting referral with clinical notes, will send notes from 6/6/22 as well as imaging when referral is authorized. Also made appt for DS to see for this hip pain to initiate PT or next steps.

## 2024-04-15 ENCOUNTER — TELEPHONE (OUTPATIENT)
Dept: FAMILY MEDICINE CLINIC | Facility: CLINIC | Age: 39
End: 2024-04-15

## 2024-04-15 NOTE — TELEPHONE ENCOUNTER
[facility] has not received the referral yet and her appt is tomorrow.  She said she talked to Jenny last week.  If they don't receive it her appt will be cancelled for tomorrow and she does not want that to happen

## 2024-04-16 NOTE — PROGRESS NOTES
Tricia French is a 39 year old female.  HPI:   Tricia is here to have chronic left hip pain evaluated. Has had 8 pregnancies. Has 3 twins with in the pregnancies. Has had low back pain as well.  Had a fall 6/6/2022  after getting out of her van and stepped in to a pot hole. Rolled her ankle losing her balance and fell onto sacrum. Was seen in our office that day for ankle and sacral pain. She then pursued chiropractic care. Called our office 4/11 asking for chiro referral .    Has had chronic lef thip and sacral pain that wraps around left ant hip. Clams hurt.  Pain is on and off from 2/10 - 8/10 pain. She is caring for 10 children.  No current outpatient medications on file.      Past Medical History:    Allergic rhinitis    Female hirsutism      Social History:  Social History     Socioeconomic History    Marital status:     Number of children: 1   Occupational History    Occupation: STYLIST   Tobacco Use    Smoking status: Never    Smokeless tobacco: Never   Vaping Use    Vaping status: Never Used   Substance and Sexual Activity    Alcohol use: No    Drug use: No   Other Topics Concern    Caffeine Concern Yes     Comment: 1  drinks per day    Exercise Yes     Comment: 4  times/week    Seat Belt Yes     Comment: 100%     Social Determinants of Health     Food Insecurity: No Food Insecurity (5/27/2023)    [facility]    Food Insecurity     Currently or in the past 3 months, have you worried your food would run out before you had money to buy more?: No     In the past 12 months, have you run out of food or been unable to get more?: No   Transportation Needs: No Transportation Needs (5/27/2023)    [facility]    Transportation Needs     Medical Transportation Needs?: No    [facility], [facility]    Social Connections     [facility], [facility]    Housing Stability        REVIEW OF SYSTEMS:   GENERAL HEALTH: feels well otherwise  SKIN: denies any unusual skin lesions or rashes  RESPIRATORY: denies shortness of breath with exertion  CARDIOVASCULAR: denies chest pain on exertion  GI: denies abdominal pain and denies heartburn  NEURO: denies headaches    EXAM:   /70   Pulse 94   Temp 97.6 °F (36.4 °C) (Tympanic)   LMP  (LMP Unknown)   SpO2 97%   Breastfeeding Yes   GENERAL: well developed, well nourished,in no apparent distress  NECK: supple,no adenopathy  LUNGS: clear to auscultation  CARDIO: RRR without murmur  GI: good BS's,no masses, HSM or tenderness  EXTREMITIES: no cyanosis, clubbing or edema, left piriformis tender. Pain along ITB    ASSESSMENT AND PLAN:   1. Chronic left hip pain  - motrin 600 mg q id  - ice  - chiro care  - XR HIP + PELVIS MIN 4 VIEWS LEFT (CPT=73503); Future    2. Lumbar spine pain  - XR LUMBAR SPINE (MIN 4 VIEWS) (CPT=72110); Future    3. Piriformis syndrome of left side  - stretches shown  - MAT    4. Trochanteric bursitis of left hip  - stretches shows  - MAT    5. Somatic dysfunction of head region  - MAT    6. Somatic dysfunction of cervical region  -MAT    7. Somatic dysfunction of pelvis region  - MAT    8. Somatic dysfunction of abdominal region  - MAT     COMPLAINT: right hip , buttock and low back pain    INITIAL ASSESSMENT:  BREATHING: belly PATTERN  PSOAS:  2/5 RIGHT, 2/5 LEFT  GLUTES:  4/5 RIGHT, 4/5 LEFT  HAMSTRING FLEXIBILITY:  90 DEGREES RIGHT, 80 DEGREES LEFT    PIRIFORMIS: flexion below pubis      POST-ACTIVATION ASSESSMENT     BREATHING: belly PATTERN  PSOAS:  4/5 RIGHT, 4/5 LEFT  GLUTES:  5/5 RIGHT, 5/5 LEFT  HAMSTRING FLEXIBILITY:  110 DEGREES RIGHT, 120 DEGREES LEFT        LATERAL SYSTEM  PIRIFORMIS: flexion to umbilicus                                     STRENGTH OR FLEXIBILITY SCORE  0: NO RESISTANCE  1:   2  3  MODERATE  RESISTANCE  4  5: EXCELLENT STRENGTH  SUBJECTIVE: feels more flexible and reduced left hip pain  OBSERVATION: relaxed      The patient indicates understanding of these issues and agrees to the plan.  The patient is asked to return in 1 month

## 2024-04-17 ENCOUNTER — OFFICE VISIT (OUTPATIENT)
Dept: FAMILY MEDICINE CLINIC | Facility: CLINIC | Age: 39
End: 2024-04-17
Payer: MEDICAID

## 2024-04-17 VITALS
TEMPERATURE: 98 F | DIASTOLIC BLOOD PRESSURE: 70 MMHG | SYSTOLIC BLOOD PRESSURE: 110 MMHG | HEART RATE: 94 BPM | OXYGEN SATURATION: 97 %

## 2024-04-17 DIAGNOSIS — M99.05 SOMATIC DYSFUNCTION OF PELVIS REGION: ICD-10-CM

## 2024-04-17 DIAGNOSIS — M99.09 SOMATIC DYSFUNCTION OF ABDOMINAL REGION: ICD-10-CM

## 2024-04-17 DIAGNOSIS — M99.01 SOMATIC DYSFUNCTION OF CERVICAL REGION: ICD-10-CM

## 2024-04-17 DIAGNOSIS — G89.29 CHRONIC LEFT HIP PAIN: Primary | ICD-10-CM

## 2024-04-17 DIAGNOSIS — G57.02 PIRIFORMIS SYNDROME OF LEFT SIDE: ICD-10-CM

## 2024-04-17 DIAGNOSIS — M54.50 LUMBAR SPINE PAIN: ICD-10-CM

## 2024-04-17 DIAGNOSIS — M25.552 CHRONIC LEFT HIP PAIN: Primary | ICD-10-CM

## 2024-04-17 DIAGNOSIS — M70.62 TROCHANTERIC BURSITIS OF LEFT HIP: ICD-10-CM

## 2024-04-17 DIAGNOSIS — M99.00 SOMATIC DYSFUNCTION OF HEAD REGION: ICD-10-CM

## 2024-04-17 PROBLEM — O35.BXX0: Status: ACTIVE | Noted: 2023-03-22

## 2024-04-17 PROCEDURE — 98927 OSTEOPATH MANJ 5-6 REGIONS: CPT | Performed by: FAMILY MEDICINE

## 2024-04-17 PROCEDURE — G8510 SCR DEP NEG, NO PLAN REQD: HCPCS | Performed by: FAMILY MEDICINE

## 2024-04-17 PROCEDURE — 99214 OFFICE O/P EST MOD 30 MIN: CPT | Performed by: FAMILY MEDICINE

## 2024-04-22 ENCOUNTER — HOSPITAL ENCOUNTER (OUTPATIENT)
Dept: GENERAL RADIOLOGY | Age: 39
Discharge: HOME | End: 2024-04-22
Attending: FAMILY MEDICINE
Payer: MEDICAID

## 2024-04-22 DIAGNOSIS — M54.50 LUMBAR SPINE PAIN: ICD-10-CM

## 2024-04-22 DIAGNOSIS — G89.29 CHRONIC LEFT HIP PAIN: ICD-10-CM

## 2024-04-22 DIAGNOSIS — M25.552 CHRONIC LEFT HIP PAIN: ICD-10-CM

## 2024-04-22 DIAGNOSIS — S76.312A STRAIN OF LEFT PIRIFORMIS MUSCLE, INITIAL ENCOUNTER: Primary | ICD-10-CM

## 2024-04-22 PROCEDURE — 72110 X-RAY EXAM L-2 SPINE 4/>VWS: CPT | Performed by: FAMILY MEDICINE

## 2024-04-22 PROCEDURE — 73502 X-RAY EXAM HIP UNI 2-3 VIEWS: CPT | Performed by: FAMILY MEDICINE

## 2024-04-26 ENCOUNTER — TELEPHONE (OUTPATIENT)
Dept: FAMILY MEDICINE CLINIC | Facility: CLINIC | Age: 39
End: 2024-04-26

## 2024-04-26 NOTE — TELEPHONE ENCOUNTER
Pt called back, pt asking if she is able to do MRI now. Reviewed with pt that per insurance, she needs to do PT first before they will pay for MRI. Pt v/u, info sent through my chart to schedule.

## 2024-04-26 NOTE — TELEPHONE ENCOUNTER
LMTCB   Will notifiy of xray results and comments. \"Tricia your hip xray is normal. I will refer you to PT\". Will notify that referral is placed and will give scheduling info.

## 2024-05-16 ENCOUNTER — APPOINTMENT (OUTPATIENT)
Dept: PHYSICAL THERAPY | Age: 39
End: 2024-05-16
Attending: FAMILY MEDICINE
Payer: MEDICAID

## 2024-05-16 ENCOUNTER — PATIENT MESSAGE (OUTPATIENT)
Dept: FAMILY MEDICINE CLINIC | Facility: CLINIC | Age: 39
End: 2024-05-16

## 2024-05-16 DIAGNOSIS — M25.552 CHRONIC LEFT HIP PAIN: Primary | ICD-10-CM

## 2024-05-16 DIAGNOSIS — G89.29 CHRONIC LEFT HIP PAIN: Primary | ICD-10-CM

## 2024-05-16 NOTE — TELEPHONE ENCOUNTER
From: Tricia French  To: CLINT Guardado  Sent: 5/16/2024 11:16 AM CDT  Subject: Transfer pt to [place]     I need the order sent to this fax to be seen for my hip at [facility] #[id number]

## 2024-05-20 ENCOUNTER — APPOINTMENT (OUTPATIENT)
Dept: PHYSICAL THERAPY | Age: 39
End: 2024-05-20
Attending: FAMILY MEDICINE
Payer: MEDICAID

## 2024-05-30 ENCOUNTER — APPOINTMENT (OUTPATIENT)
Dept: PHYSICAL THERAPY | Age: 39
End: 2024-05-30
Attending: FAMILY MEDICINE
Payer: MEDICAID

## 2024-06-03 ENCOUNTER — APPOINTMENT (OUTPATIENT)
Dept: PHYSICAL THERAPY | Age: 39
End: 2024-06-03
Attending: FAMILY MEDICINE
Payer: MEDICAID

## 2024-06-17 ENCOUNTER — PATIENT MESSAGE (OUTPATIENT)
Dept: FAMILY MEDICINE CLINIC | Facility: CLINIC | Age: 39
End: 2024-06-17

## 2024-06-17 DIAGNOSIS — M25.552 CHRONIC LEFT HIP PAIN: Primary | ICD-10-CM

## 2024-06-17 DIAGNOSIS — M62.89 PELVIC FLOOR DYSFUNCTION: ICD-10-CM

## 2024-06-17 DIAGNOSIS — G89.29 CHRONIC LEFT HIP PAIN: Primary | ICD-10-CM

## 2024-06-17 NOTE — TELEPHONE ENCOUNTER
Referral placed on 5-16-24 for physical therapy through [facility].     Left message for patient to call back to see if has started therapy.

## 2024-06-17 NOTE — TELEPHONE ENCOUNTER
From: Tricia French  To: CLINT Guardado  Sent: 6/17/2024 11:05 AM CDT  Subject: pt referral     Talking with PT they suggested I get a referral for pelvic floor and my SI. It would be with zan at  physical therapy please.

## 2024-06-18 NOTE — TELEPHONE ENCOUNTER
Per patient has started physical therapy for her Left hip and Right knee, reports hip pain is not improving and is recommending additional therapy to include pelvic floor and SI joint.     Please review referral

## 2024-06-21 ENCOUNTER — APPOINTMENT (OUTPATIENT)
Dept: PHYSICAL THERAPY | Age: 39
End: 2024-06-21
Attending: FAMILY MEDICINE
Payer: MEDICAID

## 2024-07-26 ENCOUNTER — TELEPHONE (OUTPATIENT)
Dept: FAMILY MEDICINE CLINIC | Facility: CLINIC | Age: 39
End: 2024-07-26

## 2025-01-15 ENCOUNTER — TELEPHONE (OUTPATIENT)
Facility: LOCATION | Age: 40
End: 2025-01-15

## 2025-02-19 ENCOUNTER — OFFICE VISIT (OUTPATIENT)
Dept: SURGERY | Facility: CLINIC | Age: 40
End: 2025-02-19
Payer: MEDICAID

## 2025-02-19 VITALS
OXYGEN SATURATION: 98 % | TEMPERATURE: 98 F | HEART RATE: 63 BPM | SYSTOLIC BLOOD PRESSURE: 102 MMHG | RESPIRATION RATE: 18 BRPM | DIASTOLIC BLOOD PRESSURE: 70 MMHG

## 2025-02-19 DIAGNOSIS — R22.0 SCALP MASS: ICD-10-CM

## 2025-02-19 DIAGNOSIS — R22.2 MASS OF SKIN OF BACK: Primary | ICD-10-CM

## 2025-02-19 PROCEDURE — 99203 OFFICE O/P NEW LOW 30 MIN: CPT | Performed by: STUDENT IN AN ORGANIZED HEALTH CARE EDUCATION/TRAINING PROGRAM

## 2025-02-19 NOTE — H&P
Patient ID: Tricia French is a 39 year old female.    Chief Complaint   Patient presents with    New Patient     NP- Cyst- reports cyst to scalp near right ear, denies pain        HPI: Tricia French is a 39 year old female presents to clinic for evaluation.  Patient reports having a cyst on her right scalp for a while.  She has had some discomfort.  She denies any recent infections.  She also reports having a mass of the middle portion of her lower back.  She denies any other symptoms.    Workup: None      Past Medical History  Past Medical History:    Allergic rhinitis    Female hirsutism       Past Surgical History  Past Surgical History:   Procedure Laterality Date                   Medications  No current outpatient medications on file.       Allergies  Allergies[1]    Social History  History   Smoking Status    Never   Smokeless Tobacco    Never     History   Alcohol Use No     History   Drug Use No       Family History  Family History   Problem Relation Age of Onset    Other (lumbar disc disease) Father     Other (ms) Mother        Review of Systems  Review of Systems   Constitutional: Negative.    Respiratory: Negative.     Cardiovascular: Negative.    Gastrointestinal: Negative.        Exam  Vitals:    25 1455   BP: 102/70   Pulse: 63   Resp: 18   Temp: 98.2 °F (36.8 °C)     Physical Exam  Constitutional:       Appearance: Normal appearance.   HENT:      Head:     Cardiovascular:      Rate and Rhythm: Normal rate and regular rhythm.   Pulmonary:      Effort: Pulmonary effort is normal.      Breath sounds: Normal breath sounds.   Abdominal:      General: Abdomen is flat. There is no distension.      Palpations: Abdomen is soft.      Tenderness: There is no abdominal tenderness.   Skin:     General: Skin is warm and dry.          Neurological:      Mental Status: She is alert and oriented to person, place, and time.           Assessment/Plan  Assessment   Problem List Items Addressed  This Visit          Skin    Scalp mass       Symptoms and Signs    Mass of skin of back - Primary       Tricia French is a 39 year old female with a scalp mass and lower back mass    On physical exam, patient has a 2 cm sebaceous cyst of her scalp just behind her right ear  She also has approximately 1 cm lipoma of her central mid lower back  Patient reports discomfort at both and wishes for excision  Will plan for excision with anesthesia  Procedure explained to the patient  Risk include bleeding, pain, infection, need for further intervention  Patient acknowledged understanding and wishes to proceed    If she has any questions or concerns, she is contact the office      Yudi Robbins MD  General Surgery  [facility]     CC:  CLINT Guardado DO         [1] No Known Allergies

## 2025-02-20 ENCOUNTER — TELEPHONE (OUTPATIENT)
Facility: LOCATION | Age: 40
End: 2025-02-20

## 2025-02-20 NOTE — TELEPHONE ENCOUNTER
LATANYA TORRES Patient  Member ID  [id number]    Date of Birth  1985-03-12    Gender  Female    Transaction Type  Outpatient Authorization    Organization  [facility]    Payer  [facility] logo     Certificate Information  Reference Number  [id number]    Status  NO ACTION REQUIRED    Message  Requested Service does not require preauthorization. We would strongly encourage you to check benefits for this service.    Member Information  Patient Name  LATANYA TORRES    Patient Date of Birth  1985-03-12    Patient Gender  Female    Member ID  [id number]    Relationship to Subscriber  Self    Subscriber Name  LATANYA TORRES    Requesting Provider     Name  OMARI WINTER    NPI  [id number]    Tax Id  [id number]    Specialty  [id number]    Provider Role  Provider    Address  [address] [place] [address]    Phone  [phone]    Fax  [phone]    Contact Name  SITA PLASENCIA    Service Information  Service Type  2 - Surgical    Place of Service  22 - On [facility]    Service From - To Date  2025-03-06 - 2025-05-30    Level of Service  Elective    Diagnosis Code 1  R222 - Localized swelling mass and lump trunk    Diagnosis Code 2  R220 - Localized swelling mass and lump head    Procedure Code 1 (CPT/HCPCS)  32618 - EXC TR-EXT B9+ELLIS 2.1-3CM    Quantity  1 Units    Status  NO ACTION REQUIRED    Procedure Code 2 (CPT/HCPCS)  74276 - EXC H-F-NK-SP B9+ELLIS 1.1-2    Quantity  1 Units    Status  NO ACTION REQUIRED

## 2025-02-25 ENCOUNTER — OFFICE VISIT (OUTPATIENT)
Dept: FAMILY MEDICINE CLINIC | Facility: CLINIC | Age: 40
End: 2025-02-25
Payer: MEDICAID

## 2025-02-25 VITALS
SYSTOLIC BLOOD PRESSURE: 98 MMHG | WEIGHT: 206 LBS | HEIGHT: 63 IN | OXYGEN SATURATION: 98 % | BODY MASS INDEX: 36.5 KG/M2 | DIASTOLIC BLOOD PRESSURE: 60 MMHG | TEMPERATURE: 97 F | HEART RATE: 76 BPM

## 2025-02-25 DIAGNOSIS — S93.492A SPRAIN OF ANTERIOR TALOFIBULAR LIGAMENT OF LEFT ANKLE, INITIAL ENCOUNTER: Primary | ICD-10-CM

## 2025-02-25 DIAGNOSIS — M79.672 FOOT PAIN, LEFT: ICD-10-CM

## 2025-02-25 PROCEDURE — 99214 OFFICE O/P EST MOD 30 MIN: CPT | Performed by: FAMILY MEDICINE

## 2025-02-25 RX ORDER — SODIUM CHLORIDE, SODIUM LACTATE, POTASSIUM CHLORIDE, CALCIUM CHLORIDE 600; 310; 30; 20 MG/100ML; MG/100ML; MG/100ML; MG/100ML
INJECTION, SOLUTION INTRAVENOUS CONTINUOUS
Status: CANCELLED | OUTPATIENT
Start: 2025-02-25

## 2025-02-25 RX ORDER — ACETAMINOPHEN 500 MG
1000 TABLET ORAL ONCE
Status: CANCELLED | OUTPATIENT
Start: 2025-02-25 | End: 2025-02-25

## 2025-02-25 NOTE — PROGRESS NOTES
Subjective:   Patient ID: Tricia French is a 39 year old female.    Foot Pain     L foot pain / top lat. Foot  W/o trauma  Started 3 days  Wears slippers or older boots regularly.  All foot wear has been worn down on heel.  Denies redness, swelling.  Pain is improving.  History/Other:   Review of Systems  No current outpatient medications on file.     Allergies:Allergies[1]    Objective:   Physical Exam  Musculoskeletal:         General: Tenderness present.      Right lower leg: No edema.      Left lower leg: No edema.      Comments: Pain to palpation anterior talofibular ligament.  Without crepitus.  Symmetric range of motion.  Moves all toes.  Without pain to palpation proximal fifth metatarsal, proximal fibula left  Review of her boots shows lateral wear heel.   Skin:     General: Skin is warm and dry.      Findings: No erythema.   Neurological:      General: No focal deficit present.      Mental Status: She is oriented to person, place, and time.     333 leg pattern left  Calf tight left  Decreased tibialis left  Activations x 2  Decreased calf tightness  Tibialis intact  Range of motion exercises.  Proper shoe wear.  Air splint.  Call with questions or problems.    Assessment & Plan:   1. Sprain of anterior talofibular ligament of left ankle, initial encounter    2. Foot pain, left        No orders of the defined types were placed in this encounter.      Meds This Visit:  Requested Prescriptions      No prescriptions requested or ordered in this encounter       Imaging & Referrals:  None         [1] No Known Allergies

## 2025-03-06 ENCOUNTER — ANESTHESIA (OUTPATIENT)
Dept: SURGERY | Facility: HOSPITAL | Age: 40
End: 2025-03-06
Payer: MEDICAID

## 2025-03-06 ENCOUNTER — HOSPITAL ENCOUNTER (OUTPATIENT)
Facility: HOSPITAL | Age: 40
Setting detail: HOSPITAL OUTPATIENT SURGERY
Discharge: HOME | End: 2025-03-06
Attending: STUDENT IN AN ORGANIZED HEALTH CARE EDUCATION/TRAINING PROGRAM | Admitting: STUDENT IN AN ORGANIZED HEALTH CARE EDUCATION/TRAINING PROGRAM
Payer: MEDICAID

## 2025-03-06 ENCOUNTER — ANESTHESIA EVENT (OUTPATIENT)
Dept: SURGERY | Facility: HOSPITAL | Age: 40
End: 2025-03-06
Payer: MEDICAID

## 2025-03-06 VITALS
RESPIRATION RATE: 16 BRPM | SYSTOLIC BLOOD PRESSURE: 110 MMHG | OXYGEN SATURATION: 92 % | WEIGHT: 201 LBS | HEART RATE: 55 BPM | DIASTOLIC BLOOD PRESSURE: 68 MMHG | HEIGHT: 63 IN | BODY MASS INDEX: 35.61 KG/M2 | TEMPERATURE: 97 F

## 2025-03-06 DIAGNOSIS — R22.0 SCALP MASS: ICD-10-CM

## 2025-03-06 DIAGNOSIS — R22.2 MASS OF SKIN OF BACK: ICD-10-CM

## 2025-03-06 LAB — B-HCG UR QL: NEGATIVE

## 2025-03-06 PROCEDURE — 0HB6XZZ EXCISION OF BACK SKIN, EXTERNAL APPROACH: ICD-10-PCS | Performed by: STUDENT IN AN ORGANIZED HEALTH CARE EDUCATION/TRAINING PROGRAM

## 2025-03-06 PROCEDURE — 12031 INTMD RPR S/A/T/EXT 2.5 CM/<: CPT | Performed by: STUDENT IN AN ORGANIZED HEALTH CARE EDUCATION/TRAINING PROGRAM

## 2025-03-06 PROCEDURE — 21930 EXC BACK LES SC < 3 CM: CPT | Performed by: STUDENT IN AN ORGANIZED HEALTH CARE EDUCATION/TRAINING PROGRAM

## 2025-03-06 PROCEDURE — 0HB0XZZ EXCISION OF SCALP SKIN, EXTERNAL APPROACH: ICD-10-PCS | Performed by: STUDENT IN AN ORGANIZED HEALTH CARE EDUCATION/TRAINING PROGRAM

## 2025-03-06 PROCEDURE — 11421 EXC H-F-NK-SP B9+MARG 0.6-1: CPT | Performed by: STUDENT IN AN ORGANIZED HEALTH CARE EDUCATION/TRAINING PROGRAM

## 2025-03-06 RX ORDER — HYDROMORPHONE HYDROCHLORIDE 1 MG/ML
0.6 INJECTION, SOLUTION INTRAMUSCULAR; INTRAVENOUS; SUBCUTANEOUS EVERY 5 MIN PRN
Status: DISCONTINUED | OUTPATIENT
Start: 2025-03-06 | End: 2025-03-06

## 2025-03-06 RX ORDER — ACETAMINOPHEN 500 MG
1000 TABLET ORAL ONCE
Status: DISCONTINUED | OUTPATIENT
Start: 2025-03-06 | End: 2025-03-06 | Stop reason: HOSPADM

## 2025-03-06 RX ORDER — MIDAZOLAM HYDROCHLORIDE 1 MG/ML
1 INJECTION INTRAMUSCULAR; INTRAVENOUS EVERY 5 MIN PRN
Status: DISCONTINUED | OUTPATIENT
Start: 2025-03-06 | End: 2025-03-06

## 2025-03-06 RX ORDER — HYDRALAZINE HYDROCHLORIDE 20 MG/ML
5 INJECTION INTRAMUSCULAR; INTRAVENOUS
Status: DISCONTINUED | OUTPATIENT
Start: 2025-03-06 | End: 2025-03-06

## 2025-03-06 RX ORDER — DEXAMETHASONE SODIUM PHOSPHATE 4 MG/ML
VIAL (ML) INJECTION AS NEEDED
Status: DISCONTINUED | OUTPATIENT
Start: 2025-03-06 | End: 2025-03-06 | Stop reason: SURG

## 2025-03-06 RX ORDER — LIDOCAINE HYDROCHLORIDE 10 MG/ML
INJECTION, SOLUTION EPIDURAL; INFILTRATION; INTRACAUDAL; PERINEURAL AS NEEDED
Status: DISCONTINUED | OUTPATIENT
Start: 2025-03-06 | End: 2025-03-06 | Stop reason: SURG

## 2025-03-06 RX ORDER — HYDROMORPHONE HYDROCHLORIDE 1 MG/ML
0.4 INJECTION, SOLUTION INTRAMUSCULAR; INTRAVENOUS; SUBCUTANEOUS EVERY 5 MIN PRN
Status: DISCONTINUED | OUTPATIENT
Start: 2025-03-06 | End: 2025-03-06

## 2025-03-06 RX ORDER — PROCHLORPERAZINE EDISYLATE 5 MG/ML
5 INJECTION INTRAMUSCULAR; INTRAVENOUS EVERY 8 HOURS PRN
Status: DISCONTINUED | OUTPATIENT
Start: 2025-03-06 | End: 2025-03-06

## 2025-03-06 RX ORDER — LABETALOL HYDROCHLORIDE 5 MG/ML
5 INJECTION, SOLUTION INTRAVENOUS EVERY 5 MIN PRN
Status: DISCONTINUED | OUTPATIENT
Start: 2025-03-06 | End: 2025-03-06

## 2025-03-06 RX ORDER — SCOPOLAMINE 1 MG/3D
1 PATCH, EXTENDED RELEASE TRANSDERMAL ONCE
Status: DISCONTINUED | OUTPATIENT
Start: 2025-03-06 | End: 2025-03-06

## 2025-03-06 RX ORDER — MEPERIDINE HYDROCHLORIDE 25 MG/ML
12.5 INJECTION INTRAMUSCULAR; INTRAVENOUS; SUBCUTANEOUS AS NEEDED
Status: DISCONTINUED | OUTPATIENT
Start: 2025-03-06 | End: 2025-03-06

## 2025-03-06 RX ORDER — SODIUM CHLORIDE, SODIUM LACTATE, POTASSIUM CHLORIDE, CALCIUM CHLORIDE 600; 310; 30; 20 MG/100ML; MG/100ML; MG/100ML; MG/100ML
INJECTION, SOLUTION INTRAVENOUS CONTINUOUS
Status: DISCONTINUED | OUTPATIENT
Start: 2025-03-06 | End: 2025-03-06

## 2025-03-06 RX ORDER — ONDANSETRON 2 MG/ML
INJECTION INTRAMUSCULAR; INTRAVENOUS AS NEEDED
Status: DISCONTINUED | OUTPATIENT
Start: 2025-03-06 | End: 2025-03-06 | Stop reason: SURG

## 2025-03-06 RX ORDER — HEPARIN SODIUM 5000 [USP'U]/ML
5000 INJECTION, SOLUTION INTRAVENOUS; SUBCUTANEOUS ONCE
Status: COMPLETED | OUTPATIENT
Start: 2025-03-06 | End: 2025-03-06

## 2025-03-06 RX ORDER — BUPIVACAINE HYDROCHLORIDE AND EPINEPHRINE 5; 5 MG/ML; UG/ML
INJECTION, SOLUTION EPIDURAL; INTRACAUDAL; PERINEURAL AS NEEDED
Status: DISCONTINUED | OUTPATIENT
Start: 2025-03-06 | End: 2025-03-06 | Stop reason: HOSPADM

## 2025-03-06 RX ORDER — HYDROMORPHONE HYDROCHLORIDE 1 MG/ML
0.2 INJECTION, SOLUTION INTRAMUSCULAR; INTRAVENOUS; SUBCUTANEOUS EVERY 5 MIN PRN
Status: DISCONTINUED | OUTPATIENT
Start: 2025-03-06 | End: 2025-03-06

## 2025-03-06 RX ORDER — DIPHENHYDRAMINE HYDROCHLORIDE 50 MG/ML
12.5 INJECTION INTRAMUSCULAR; INTRAVENOUS AS NEEDED
Status: DISCONTINUED | OUTPATIENT
Start: 2025-03-06 | End: 2025-03-06

## 2025-03-06 RX ORDER — ONDANSETRON 2 MG/ML
INJECTION INTRAMUSCULAR; INTRAVENOUS
Status: COMPLETED
Start: 2025-03-06 | End: 2025-03-06

## 2025-03-06 RX ORDER — OXYCODONE HYDROCHLORIDE 5 MG/1
5 TABLET ORAL ONCE AS NEEDED
Status: DISCONTINUED | OUTPATIENT
Start: 2025-03-06 | End: 2025-03-06

## 2025-03-06 RX ORDER — ACETAMINOPHEN 500 MG
1000 TABLET ORAL ONCE AS NEEDED
Status: DISCONTINUED | OUTPATIENT
Start: 2025-03-06 | End: 2025-03-06

## 2025-03-06 RX ORDER — SCOPOLAMINE 1 MG/3D
PATCH, EXTENDED RELEASE TRANSDERMAL
Status: DISCONTINUED
Start: 2025-03-06 | End: 2025-03-06

## 2025-03-06 RX ORDER — NALOXONE HYDROCHLORIDE 0.4 MG/ML
80 INJECTION, SOLUTION INTRAMUSCULAR; INTRAVENOUS; SUBCUTANEOUS AS NEEDED
Status: DISCONTINUED | OUTPATIENT
Start: 2025-03-06 | End: 2025-03-06

## 2025-03-06 RX ORDER — ONDANSETRON 2 MG/ML
4 INJECTION INTRAMUSCULAR; INTRAVENOUS EVERY 6 HOURS PRN
Status: DISCONTINUED | OUTPATIENT
Start: 2025-03-06 | End: 2025-03-06

## 2025-03-06 RX ADMIN — ONDANSETRON 4 MG: 2 INJECTION INTRAMUSCULAR; INTRAVENOUS at 08:22:00

## 2025-03-06 RX ADMIN — SODIUM CHLORIDE, SODIUM LACTATE, POTASSIUM CHLORIDE, CALCIUM CHLORIDE: 600; 310; 30; 20 INJECTION, SOLUTION INTRAVENOUS at 07:38:00

## 2025-03-06 RX ADMIN — SODIUM CHLORIDE, SODIUM LACTATE, POTASSIUM CHLORIDE, CALCIUM CHLORIDE: 600; 310; 30; 20 INJECTION, SOLUTION INTRAVENOUS at 08:33:00

## 2025-03-06 RX ADMIN — DEXAMETHASONE SODIUM PHOSPHATE 8 MG: 4 MG/ML VIAL (ML) INJECTION at 07:41:00

## 2025-03-06 RX ADMIN — LIDOCAINE HYDROCHLORIDE 100 MG: 10 INJECTION, SOLUTION EPIDURAL; INFILTRATION; INTRACAUDAL; PERINEURAL at 07:41:00

## 2025-03-06 NOTE — OPERATIVE REPORT
OPERATIVE NOTE    Tricia French Location: OR   [facility] [id number] MRN [id number]   Admission Date 3/6/2025 Operation Date 3/6/2025   Attending Physician Yudi Robbins MD Operating Physician Yudi Robbins MD     PREOPERATIVE DIAGNOSIS  Right scalp mass  Back mass    POSTOPERATIVE DIAGNOSIS  Same    PROCEDURE PERFORMED  Excision of right scalp mass and back mass    SURGEON  Yudi Robbins MD    ASSISTANTS  None    ANESTHESIA  General And local    INDICATIONS  This is a 39-year-old woman who presented to the outpatient setting with a right scalp mass as well as a lower back mass.  She endorsed discomfort at the sites.  Excision was indicated.  The procedure and all the risks were discussed with the patient and she consented.    FINDINGS   1 cm right scalp mass consistent with a sebaceous cyst, 1 cm back mass consistent with a lipoma    FINDINGS/DESCRIPTION OF PROCEDURE  After informed consent, patient was brought to the operating room and placed in supine position. Bilateral SCDs were placed and pre-operative antibiotics administered. Anesthesia was induced without any complication. Patient was prepped and draped in the usual sterile fashion. Time out was performed confirming patient, procedure, and site.    I first started with the scalp mass.  Proposed line of incision was made.  Local was infiltrated in the tissues over the mass.  Using a 15 blade, incision was made.  Subcutaneous tissues were dissected until the mass was encountered. Using sharp dissection, the mass was circumferentially dissected until it was completely freed from the surrounding subcutaneous tissues.  Mass appeared to be consistent with a sebaceous cyst.  It was 1 cm.  This was sent to pathology as scalp mass.  The incision was 1 x 1 cm and closed in layers.  A 3-0 Vicryl deep dermal suture was used to approximate the skin edges and a 4-0 Monocryl running subcuticular was used to close the skin.  Wound was cleaned and dressed with triple  antibiotic ointment.    I then proceeded with back mass.  Proposed line of incision was made.  Local was infiltrated in the tissues over the mass.  Using a 15 blade, incision was made.  Subcutaneous tissues were dissected until the mass was encountered.  Using blunt dissection, the mass was circumferentially dissected until was completely freed from the surrounding subcutaneous tissues.  Mass appeared to be consistent with a lipoma.  It was 1 cm.  This was sent to pathology as back mass.  The incision was 1 x 1 cm and closed in layers. A 3-0 Vicryl deep dermal suture was used to approximate the skin edges and a 4-0 Monocryl running subcuticular was used to close the skin.  Wound was cleaned and dressed with Dermabond.    At the end of the case, all counts were correct. Patient tolerated procedure well. Patient was awakened and transferred to PACU in a hemodynamically stable condition.    SPECIMENS REMOVED  Scalp mass  Back mass    ESTIMATED BLOOD LOSS  5 ml    COMPLICATIONS  None     Yudi Robbins MD

## 2025-03-06 NOTE — H&P
The above referenced H&P was reviewed by Yudi Robbins MD on 3/6/2025, the patient was examined and no significant changes have occurred in the patient's condition since the H&P was performed.  Risks, benefits, alternative treatments and consequences of no treatment were discussed.  We will proceed with procedure as planned.    Masses marked and all questions answered.    Yudi Robbins MD  3/6/2025  7:34 AM        Patient ID: Tricia French is a 39 year old female.    Chief Complaint   Patient presents with    New Patient     NP- Cyst- reports cyst to scalp near right ear, denies pain        HPI: Tricia French is a 39 year old female presents to clinic for evaluation.  Patient reports having a cyst on her right scalp for a while.  She has had some discomfort.  She denies any recent infections.  She also reports having a mass of the middle portion of her lower back.  She denies any other symptoms.    Workup: None      Past Medical History  Past Medical History:    Allergic rhinitis    Female hirsutism       Past Surgical History  Past Surgical History:   Procedure Laterality Date                   Medications  No current outpatient medications on file.       Allergies  [Allergies]    [Allergies]  No Known Allergies    Social History  History   Smoking Status    Never   Smokeless Tobacco    Never     History   Alcohol Use No     History   Drug Use No       Family History  Family History   Problem Relation Age of Onset    Other (lumbar disc disease) Father     Other (ms) Mother        Review of Systems  Review of Systems   Constitutional: Negative.    Respiratory: Negative.     Cardiovascular: Negative.    Gastrointestinal: Negative.        Exam  Vitals:    25 1455   BP: 102/70   Pulse: 63   Resp: 18   Temp: 98.2 °F (36.8 °C)     Physical Exam  Constitutional:       Appearance: Normal appearance.   HENT:      Head:     Cardiovascular:      Rate and Rhythm: Normal rate and regular rhythm.    Pulmonary:      Effort: Pulmonary effort is normal.      Breath sounds: Normal breath sounds.   Abdominal:      General: Abdomen is flat. There is no distension.      Palpations: Abdomen is soft.      Tenderness: There is no abdominal tenderness.   Skin:     General: Skin is warm and dry.          Neurological:      Mental Status: She is alert and oriented to person, place, and time.           Assessment/Plan  Assessment   Problem List Items Addressed This Visit          Skin    Scalp mass       Symptoms and Signs    Mass of skin of back - Primary       Tricia French is a 39 year old female with a scalp mass and lower back mass    On physical exam, patient has a 2 cm sebaceous cyst of her scalp just behind her right ear  She also has approximately 1 cm lipoma of her central mid lower back  Patient reports discomfort at both and wishes for excision  Will plan for excision with anesthesia  Procedure explained to the patient  Risk include bleeding, pain, infection, need for further intervention  Patient acknowledged understanding and wishes to proceed    If she has any questions or concerns, she is contact the office      Yudi Robbins MD  General Surgery  [facility]     CC:  CLINT Guardado DO

## 2025-03-06 NOTE — ANESTHESIA PREPROCEDURE EVALUATION
PRE-OP EVALUATION    Patient Name: Tricia French    Admit Diagnosis: Mass of skin of back [R22.2]  Scalp mass [R22.0]    Pre-op Diagnosis: Mass of skin of back [R22.2]  Scalp mass [R22.0]    EXCISION OF RIGHT SCALP MASS AND BACK MASS    Anesthesia Procedure: EXCISION OF RIGHT SCALP MASS AND BACK MASS (Right)    Surgeons and Role:     * Yudi Robbins MD - Primary    Pre-op vitals reviewed.  Temp: 97.8 °F (36.6 °C)  Pulse: 66  Resp: 16  BP: 125/72  SpO2: 98 %  Body mass index is 35.61 kg/m².    Current medications reviewed.  Hospital Medications:   acetaminophen (Tylenol Extra Strength) tab 1,000 mg  1,000 mg Oral Once    scopolamine (Transderm-Scop) 1 MG/3DAYS patch 1 patch  1 patch Transdermal Once    lactated ringers infusion   Intravenous Continuous    [COMPLETED] heparin (Porcine) 5000 UNIT/ML injection 5,000 Units  5,000 Units Subcutaneous Once    ceFAZolin (Ancef) 2g in 10mL IV syringe premix  2 g Intravenous Once    scopolamine (Transderm-Scop) 1 MG/3DAYS patch        ceFAZolin (Ancef) 2 g/10mL IV syringe premix           Outpatient Medications:   Prescriptions Prior to Admission[1]    Allergies: Patient has no known allergies.      Anesthesia Evaluation    Patient summary reviewed.    Anesthetic Complications  (+) history of anesthetic complications  History of: PONV       GI/Hepatic/Renal    Negative GI/hepatic/renal ROS.                             Cardiovascular                (+) obesity                                       Endo/Other    Negative endo/other ROS.                              Pulmonary    Negative pulmonary ROS.                       Neuro/Psych    Negative neuro/psych ROS.                                  Past Surgical History:   Procedure Laterality Date                 Social History     Socioeconomic History    Marital status:     Number of children: 1   Occupational History    Occupation: [employer]   Tobacco Use    Smoking status: Never    Smokeless tobacco:  Never   Vaping Use    Vaping status: Never Used   Substance and Sexual Activity    Alcohol use: No    Drug use: Yes     Types: Cannabis   Other Topics Concern    Caffeine Concern Yes     Comment: 1  drinks per day    Exercise Yes     Comment: 4  times/week    Seat Belt Yes     Comment: 100%     History   Drug Use    Types: Cannabis     Available pre-op labs reviewed.               Airway      Mallampati: I  Mouth opening: >3 FB  TM distance: > 6 cm  Neck ROM: full Cardiovascular      Rhythm: regular  Rate: normal     Dental  Comment: No obvious evidence of dental disease           Pulmonary      Breath sounds clear to auscultation bilaterally.               Other findings              ASA: 2   Plan: general  NPO status verified and patient meets guidelines.        Comment: Depending on positioning needed for surgical approach, we discussed intubation and prone positioning (including risk of POVL secondary to prone positioning) and potential LMA and lateral positioning.  All anesthetic related questions were addressed.  Pt expressed understanding of and agreement with the anesthetic plan.      Plan/risks discussed with: spouse and patient                Present on Admission:  **None**             [1]   No medications prior to admission.

## 2025-03-06 NOTE — ANESTHESIA POSTPROCEDURE EVALUATION
[facility]    Tricia French Patient Status:  Hospital Outpatient Surgery   Age/Gender 39 year old female MRN [id number]   Location [facility] SURGERY Attending Yudi Robbins MD   Hosp Day # 0 PCP M Selin Guardado DO       Anesthesia Post-op Note    EXCISION OF RIGHT SCALP MASS AND BACK MASS    Procedure Summary       Date: 03/06/25 Room / Location:  MAIN OR 10 /  MAIN OR    Anesthesia Start: 0738 Anesthesia Stop: 0833    Procedure: EXCISION OF RIGHT SCALP MASS AND BACK MASS (Right) Diagnosis:       Mass of skin of back      Scalp mass      (Mass of skin of back [R22.2]Scalp mass [R22.0])    Surgeons: Yudi Robbins MD Anesthesiologist: Ari Gold MD    Anesthesia Type: general ASA Status: 2            Anesthesia Type: general    Vitals Value Taken Time   /69 03/06/25 0835   Temp 98.6 °F (37 °C) 03/06/25 0835   Pulse 96 03/06/25 0835   Resp 18 03/06/25 0835   SpO2 98 % 03/06/25 0835           Patient Location: PACU    Anesthesia Type: general    Airway Patency: extubated and patent    Postop Pain Control: adequate    Mental Status: preanesthetic baseline    Nausea/Vomiting: none    Cardiopulmonary/Hydration status: stable euvolemic    Complications: no apparent anesthesia related complications    Postop vital signs: stable    Dental Exam: Unchanged from Preop    Patient to be discharged from PACU when criteria met.

## 2025-03-06 NOTE — DISCHARGE INSTRUCTIONS
Mass Excision  Dr. Yudi Robbins    MEDICATIONS  For post-operative pain control, the medications are usually Tylenol and ibuprofen.  You can take tylenol 1g three times a day and ibuprofen 400-600mg in between up to 4 times a day as needed for pain as well.  Please ask your surgeon before resuming blood thinners such as aspirin, Plavix or Coumadin.  All other home medications may be resumed as scheduled.     DIET  The patient may resume a general diet immediately.  This is not a good time to eat excessively.  The patient should eat in moderation and stick with foods the patient feels are easy to digest. There should be no alcohol consumption in the immediate recovery time period or within six hours of taking narcotics.    WOUND CARE  The dressing is usually a dissolvable glue which protects the wound. The patient can take a shower starting the day after surgery, but please, no baths or soaking. Please do not put any creams or ointments on the surgical incisions.  If the patient does have a top dressing with clear tape and gauze, this dressing may be removed in 2 days. Do not remove the steri-strips or butterfly tapes that are white and adherent to the skin.  The steri-strips will eventually peel up at the ends and at this point they may be removed.  This is usually seven to ten days after surgery.  When showering, soap can get on the wounds but do not scrub over the wounds.  Avoid tub baths, swimming or sitting in a hot tub for two weeks.  If visible sutures or staples are present they will be removed in the office by the surgeon or nurse.  Most wounds will be closed with dissolving suture underneath the skin.  These sutures will dissolve on their own.  If a drain is present make sure the patient receives drain care instructions from the nurse prior to discharge.  Most patients will not have a drain.    If the wound is on the scalp, there will be triple antibiotic ointment on the wound.  The patient should  reapply Neosporin or triple antibiotic ointment twice a day for 3 days.  The patient should also keep the scalp wound dry and protected.  Do not wash the hair for 7 days.    ACTIVITY  Activity is usually as tolerated. Nothing called work or exercise until the follow up visit.  No ‘stair-master’, power walking, jogging or workouts until the follow up visit.  Patients should seek further activity limits at the time of their appointment.    APPOINTMENT  Please call our office for an appointment in seven to ten days of discharge.  Any fever greater than 100.5, chills, nausea, vomiting, or severe diarrhea please call our office.  If the wound turns red, hot, swollen, becomes increasingly painful, or drains pus call us immediately at [phone].  For life threatening emergencies call 911.  For non-emergent care please call our office after 8:30 a.m. Monday through Friday.  The number listed above is our office number; our phone automatically switches to our answering service if we are not there.  Please do not use the emergency room for non-urgent care.    Thank you for entrusting us with your care.  [facility]--General Surgery

## 2025-03-06 NOTE — ANESTHESIA PROCEDURE NOTES
Airway  Date/Time: 3/6/2025 7:42 AM  Urgency: elective      General Information and Staff    Patient location during procedure: OR  Anesthesiologist: Ari Gold MD  Performed: anesthesiologist   Performed by: Ari Gold MD  Authorized by: Ari Gold MD      Indications and Patient Condition  Indications for airway management: anesthesia  Sedation level: deep  Preoxygenated: yes  Patient position: sniffing  Mask difficulty assessment: 0 - not attempted    Final Airway Details  Final airway type: supraglottic airway      Successful airway: classic  Size 4       Number of attempts at approach: 1  Number of other approaches attempted: 0    Additional Comments  Easy LMA placement.  No evidence of facial, dental, or oral trauma.    Ari Gold MD  [facility]

## 2025-04-01 ENCOUNTER — TELEPHONE (OUTPATIENT)
Dept: FAMILY MEDICINE CLINIC | Facility: CLINIC | Age: 40
End: 2025-04-01

## 2025-04-01 DIAGNOSIS — G89.29 CHRONIC BILATERAL LOW BACK PAIN WITH LEFT-SIDED SCIATICA: Primary | ICD-10-CM

## 2025-04-01 DIAGNOSIS — M54.42 CHRONIC BILATERAL LOW BACK PAIN WITH LEFT-SIDED SCIATICA: Primary | ICD-10-CM

## 2025-04-01 NOTE — TELEPHONE ENCOUNTER
Wanted to see if can put in chiropractic referral dr jonas at [facility] in [place]

## 2025-04-09 ENCOUNTER — PATIENT MESSAGE (OUTPATIENT)
Dept: FAMILY MEDICINE CLINIC | Facility: CLINIC | Age: 40
End: 2025-04-09

## 2025-04-09 DIAGNOSIS — B00.1 RECURRENT COLD SORES: Primary | ICD-10-CM

## 2025-04-09 RX ORDER — ACYCLOVIR 50 MG/G
OINTMENT TOPICAL
Qty: 15 G | Refills: 0 | Status: CANCELLED
Start: 2025-04-09

## 2025-04-09 RX ORDER — ACYCLOVIR 800 MG/1
800 TABLET ORAL 3 TIMES DAILY
Qty: 21 TABLET | Refills: 1 | Status: SHIPPED | OUTPATIENT
Start: 2025-04-09 | End: 2025-04-11

## 2025-04-09 NOTE — TELEPHONE ENCOUNTER
Acyclovir ointment   Last refill 5/13/22 #15G 0ref  LOV 4/17/24 acute, 5/10/22 px  No future appointments.      See attached my chart message and photo for reference.

## 2025-04-10 ENCOUNTER — TELEPHONE (OUTPATIENT)
Dept: FAMILY MEDICINE CLINIC | Facility: CLINIC | Age: 40
End: 2025-04-10

## 2025-04-11 RX ORDER — ACYCLOVIR 50 MG/G
OINTMENT TOPICAL
Qty: 15 G | Refills: 0 | Status: SHIPPED | OUTPATIENT
Start: 2025-04-11 | End: 2025-04-11

## 2025-04-11 RX ORDER — ACYCLOVIR 800 MG/1
800 TABLET ORAL 3 TIMES DAILY
Qty: 21 TABLET | Refills: 1 | Status: SHIPPED | OUTPATIENT
Start: 2025-04-11 | End: 2025-04-25

## 2025-04-11 NOTE — TELEPHONE ENCOUNTER
Acyclovir ointment PA denied per insurance.     My chart sent to pt to notify of denial, will reorder oral acyclovir tablets.

## (undated) DIAGNOSIS — M62.830 SPASM OF THORACIC BACK MUSCLE: Primary | ICD-10-CM

## (undated) DEVICE — SOLUTION IRRIG 1000ML 0.9% NACL USP BTL

## (undated) DEVICE — GLOVE SUR 6.5 SENSICARE PI PIP GRN PWD F

## (undated) DEVICE — SYRINGE MED 10ML LL TIP W/O SFTY DISP

## (undated) DEVICE — GLOVE SUR 6.5 SENSICARE PI PIP CRM PWD F

## (undated) DEVICE — VIOLET BRAIDED (POLYGLACTIN 910), SYNTHETIC ABSORBABLE SUTURE: Brand: COATED VICRYL

## (undated) DEVICE — GAUZE,SPONGE,4"X4",12PLY,STERILE,LF,2'S: Brand: MEDLINE

## (undated) DEVICE — SOLUTION PREP 26ML 0.7% POVACRYLEX 74% ISO

## (undated) DEVICE — SLEEVE COMPR MD KNEE LEN SGL USE KENDALL SCD

## (undated) DEVICE — ADHESIVE SKIN TOP FOR WND CLSR DERMBND ADV

## (undated) DEVICE — SUT MCRYL 4-0 18IN PS-2 ABSRB UD 19MM 3/8 CIR

## (undated) DEVICE — MINI LAP PACK-LF: Brand: MEDLINE INDUSTRIES, INC.